# Patient Record
Sex: MALE | Race: WHITE | NOT HISPANIC OR LATINO | Employment: PART TIME | URBAN - METROPOLITAN AREA
[De-identification: names, ages, dates, MRNs, and addresses within clinical notes are randomized per-mention and may not be internally consistent; named-entity substitution may affect disease eponyms.]

---

## 2017-01-06 ENCOUNTER — ALLSCRIPTS OFFICE VISIT (OUTPATIENT)
Dept: OTHER | Facility: OTHER | Age: 32
End: 2017-01-06

## 2017-02-28 ENCOUNTER — ALLSCRIPTS OFFICE VISIT (OUTPATIENT)
Dept: OTHER | Facility: OTHER | Age: 32
End: 2017-02-28

## 2017-02-28 DIAGNOSIS — K21.9 GASTRO-ESOPHAGEAL REFLUX DISEASE WITHOUT ESOPHAGITIS: ICD-10-CM

## 2017-03-23 ENCOUNTER — APPOINTMENT (OUTPATIENT)
Dept: LAB | Facility: MEDICAL CENTER | Age: 32
End: 2017-03-23
Attending: INTERNAL MEDICINE
Payer: COMMERCIAL

## 2017-03-23 DIAGNOSIS — K21.9 GASTRO-ESOPHAGEAL REFLUX DISEASE WITHOUT ESOPHAGITIS: ICD-10-CM

## 2017-03-23 LAB
ALBUMIN SERPL BCP-MCNC: 4 G/DL (ref 3.5–5)
ALP SERPL-CCNC: 63 U/L (ref 46–116)
ALT SERPL W P-5'-P-CCNC: 29 U/L (ref 12–78)
ANION GAP SERPL CALCULATED.3IONS-SCNC: 3 MMOL/L (ref 4–13)
AST SERPL W P-5'-P-CCNC: 15 U/L (ref 5–45)
BASOPHILS # BLD AUTO: 0.04 THOUSANDS/ΜL (ref 0–0.1)
BASOPHILS NFR BLD AUTO: 1 % (ref 0–1)
BILIRUB SERPL-MCNC: 0.3 MG/DL (ref 0.2–1)
BUN SERPL-MCNC: 17 MG/DL (ref 5–25)
CALCIUM SERPL-MCNC: 8.7 MG/DL (ref 8.3–10.1)
CHLORIDE SERPL-SCNC: 105 MMOL/L (ref 100–108)
CO2 SERPL-SCNC: 30 MMOL/L (ref 21–32)
CREAT SERPL-MCNC: 0.79 MG/DL (ref 0.6–1.3)
EOSINOPHIL # BLD AUTO: 0.29 THOUSAND/ΜL (ref 0–0.61)
EOSINOPHIL NFR BLD AUTO: 5 % (ref 0–6)
ERYTHROCYTE [DISTWIDTH] IN BLOOD BY AUTOMATED COUNT: 12.7 % (ref 11.6–15.1)
GFR SERPL CREATININE-BSD FRML MDRD: >60 ML/MIN/1.73SQ M
GLUCOSE P FAST SERPL-MCNC: 80 MG/DL (ref 65–99)
HCT VFR BLD AUTO: 44.5 % (ref 36.5–49.3)
HGB BLD-MCNC: 15.3 G/DL (ref 12–17)
LYMPHOCYTES # BLD AUTO: 1.74 THOUSANDS/ΜL (ref 0.6–4.47)
LYMPHOCYTES NFR BLD AUTO: 32 % (ref 14–44)
MCH RBC QN AUTO: 32 PG (ref 26.8–34.3)
MCHC RBC AUTO-ENTMCNC: 34.4 G/DL (ref 31.4–37.4)
MCV RBC AUTO: 93 FL (ref 82–98)
MONOCYTES # BLD AUTO: 0.54 THOUSAND/ΜL (ref 0.17–1.22)
MONOCYTES NFR BLD AUTO: 10 % (ref 4–12)
NEUTROPHILS # BLD AUTO: 2.77 THOUSANDS/ΜL (ref 1.85–7.62)
NEUTS SEG NFR BLD AUTO: 52 % (ref 43–75)
NRBC BLD AUTO-RTO: 0 /100 WBCS
PLATELET # BLD AUTO: 253 THOUSANDS/UL (ref 149–390)
PMV BLD AUTO: 10 FL (ref 8.9–12.7)
POTASSIUM SERPL-SCNC: 3.9 MMOL/L (ref 3.5–5.3)
PROT SERPL-MCNC: 7.1 G/DL (ref 6.4–8.2)
RBC # BLD AUTO: 4.78 MILLION/UL (ref 3.88–5.62)
SODIUM SERPL-SCNC: 138 MMOL/L (ref 136–145)
WBC # BLD AUTO: 5.43 THOUSAND/UL (ref 4.31–10.16)

## 2017-03-23 PROCEDURE — 85025 COMPLETE CBC W/AUTO DIFF WBC: CPT

## 2017-03-23 PROCEDURE — 80053 COMPREHEN METABOLIC PANEL: CPT

## 2017-03-23 PROCEDURE — 36415 COLL VENOUS BLD VENIPUNCTURE: CPT

## 2017-03-24 ENCOUNTER — GENERIC CONVERSION - ENCOUNTER (OUTPATIENT)
Dept: OTHER | Facility: OTHER | Age: 32
End: 2017-03-24

## 2017-03-28 ENCOUNTER — APPOINTMENT (OUTPATIENT)
Dept: LAB | Facility: MEDICAL CENTER | Age: 32
End: 2017-03-28
Attending: INTERNAL MEDICINE
Payer: COMMERCIAL

## 2017-03-28 DIAGNOSIS — K21.9 GASTRO-ESOPHAGEAL REFLUX DISEASE WITHOUT ESOPHAGITIS: ICD-10-CM

## 2017-03-28 PROCEDURE — 87338 HPYLORI STOOL AG IA: CPT

## 2017-03-29 LAB — H PYLORI AG STL QL IA: NEGATIVE

## 2017-04-12 ENCOUNTER — GENERIC CONVERSION - ENCOUNTER (OUTPATIENT)
Dept: OTHER | Facility: OTHER | Age: 32
End: 2017-04-12

## 2017-04-21 ENCOUNTER — ALLSCRIPTS OFFICE VISIT (OUTPATIENT)
Dept: OTHER | Facility: OTHER | Age: 32
End: 2017-04-21

## 2017-04-26 RX ORDER — ACETAMINOPHEN 325 MG/1
325 TABLET ORAL
COMMUNITY
End: 2018-05-21

## 2017-04-26 RX ORDER — NICOTINE POLACRILEX 4 MG/1
20 GUM, CHEWING ORAL
COMMUNITY
End: 2018-03-27 | Stop reason: SDUPTHER

## 2017-04-26 RX ORDER — NAPROXEN SODIUM 220 MG
220 TABLET ORAL 2 TIMES DAILY WITH MEALS
COMMUNITY
End: 2018-03-05 | Stop reason: SDUPTHER

## 2017-04-30 ENCOUNTER — ANESTHESIA EVENT (OUTPATIENT)
Dept: GASTROENTEROLOGY | Facility: MEDICAL CENTER | Age: 32
End: 2017-04-30
Payer: COMMERCIAL

## 2017-05-01 ENCOUNTER — ANESTHESIA (OUTPATIENT)
Dept: GASTROENTEROLOGY | Facility: MEDICAL CENTER | Age: 32
End: 2017-05-01
Payer: COMMERCIAL

## 2017-05-01 ENCOUNTER — GENERIC CONVERSION - ENCOUNTER (OUTPATIENT)
Dept: GASTROENTEROLOGY | Facility: MEDICAL CENTER | Age: 32
End: 2017-05-01

## 2017-05-01 ENCOUNTER — HOSPITAL ENCOUNTER (OUTPATIENT)
Facility: MEDICAL CENTER | Age: 32
Setting detail: OUTPATIENT SURGERY
Discharge: HOME | End: 2017-05-01
Attending: INTERNAL MEDICINE | Admitting: INTERNAL MEDICINE
Payer: COMMERCIAL

## 2017-05-01 VITALS
BODY MASS INDEX: 27.31 KG/M2 | OXYGEN SATURATION: 95 % | HEIGHT: 64 IN | RESPIRATION RATE: 16 BRPM | TEMPERATURE: 97.7 F | HEART RATE: 86 BPM | DIASTOLIC BLOOD PRESSURE: 85 MMHG | SYSTOLIC BLOOD PRESSURE: 129 MMHG | WEIGHT: 160 LBS

## 2017-05-01 DIAGNOSIS — K21.9 GASTRO-ESOPHAGEAL REFLUX DISEASE WITHOUT ESOPHAGITIS: ICD-10-CM

## 2017-05-01 PROCEDURE — 88342 IMHCHEM/IMCYTCHM 1ST ANTB: CPT | Performed by: INTERNAL MEDICINE

## 2017-05-01 PROCEDURE — 88305 TISSUE EXAM BY PATHOLOGIST: CPT | Performed by: INTERNAL MEDICINE

## 2017-05-01 RX ORDER — SODIUM CHLORIDE 9 MG/ML
125 INJECTION, SOLUTION INTRAVENOUS CONTINUOUS
Status: DISCONTINUED | OUTPATIENT
Start: 2017-05-01 | End: 2017-05-01 | Stop reason: HOSPADM

## 2017-05-01 RX ORDER — PROPOFOL 10 MG/ML
INJECTION, EMULSION INTRAVENOUS AS NEEDED
Status: DISCONTINUED | OUTPATIENT
Start: 2017-05-01 | End: 2017-05-01 | Stop reason: SURG

## 2017-05-01 RX ADMIN — PROPOFOL 120 MG: 10 INJECTION, EMULSION INTRAVENOUS at 09:46

## 2017-05-01 RX ADMIN — PROPOFOL 80 MG: 10 INJECTION, EMULSION INTRAVENOUS at 09:48

## 2017-05-01 RX ADMIN — SODIUM CHLORIDE 125 ML/HR: 0.9 INJECTION, SOLUTION INTRAVENOUS at 09:15

## 2017-05-11 ENCOUNTER — GENERIC CONVERSION - ENCOUNTER (OUTPATIENT)
Dept: OTHER | Facility: OTHER | Age: 32
End: 2017-05-11

## 2017-05-12 ENCOUNTER — ALLSCRIPTS OFFICE VISIT (OUTPATIENT)
Dept: OTHER | Facility: OTHER | Age: 32
End: 2017-05-12

## 2017-05-31 ENCOUNTER — ALLSCRIPTS OFFICE VISIT (OUTPATIENT)
Dept: OTHER | Facility: OTHER | Age: 32
End: 2017-05-31

## 2017-05-31 ENCOUNTER — HOSPITAL ENCOUNTER (OUTPATIENT)
Dept: RADIOLOGY | Facility: CLINIC | Age: 32
Discharge: HOME | End: 2017-05-31
Payer: COMMERCIAL

## 2017-05-31 DIAGNOSIS — M54.9 DORSALGIA: ICD-10-CM

## 2017-05-31 PROCEDURE — 72082 X-RAY EXAM ENTIRE SPI 2/3 VW: CPT

## 2017-06-08 ENCOUNTER — ALLSCRIPTS OFFICE VISIT (OUTPATIENT)
Dept: OTHER | Facility: OTHER | Age: 32
End: 2017-06-08

## 2017-07-06 ENCOUNTER — ALLSCRIPTS OFFICE VISIT (OUTPATIENT)
Dept: OTHER | Facility: OTHER | Age: 32
End: 2017-07-06

## 2017-08-03 ENCOUNTER — ALLSCRIPTS OFFICE VISIT (OUTPATIENT)
Dept: OTHER | Facility: OTHER | Age: 32
End: 2017-08-03

## 2017-08-09 ENCOUNTER — GENERIC CONVERSION - ENCOUNTER (OUTPATIENT)
Dept: OTHER | Facility: OTHER | Age: 32
End: 2017-08-09

## 2017-08-14 ENCOUNTER — GENERIC CONVERSION - ENCOUNTER (OUTPATIENT)
Dept: OTHER | Facility: OTHER | Age: 32
End: 2017-08-14

## 2017-09-15 ENCOUNTER — ALLSCRIPTS OFFICE VISIT (OUTPATIENT)
Dept: OTHER | Facility: OTHER | Age: 32
End: 2017-09-15

## 2017-09-27 ENCOUNTER — ALLSCRIPTS OFFICE VISIT (OUTPATIENT)
Dept: OTHER | Facility: OTHER | Age: 32
End: 2017-09-27

## 2017-10-26 NOTE — PROGRESS NOTES
Assessment  1  Chronic right-sided thoracic back pain (724 1,338 29) (M54 6,G89 29)   2  Chronic myofascial pain (729 1,338 29) (M79 1,G89 29)   3  Pain syndrome, chronic (338 4) (G89 4)    Plan  Chronic myofascial pain, Chronic right-sided thoracic back pain    · Chlorzoxazone 500 MG Oral Tablet; TAKE 1 TABLET TWICE DAILY   Rx By: Oz Mode; Dispense: 30 Days ; #:60 Tablet; Refill: 1;For: Chronic myofascial pain, Chronic right-sided thoracic back pain; ANAM = N; Verified Transmission to [facility]ARMACY #[id number] Last Updated By: System, [employer]; 9/15/2017 12:15:43 PM   · Follow-up Visit in 4 Weeks Evaluation and Treatment  Follow-up with NP  Status: Hold  For - Scheduling  Requested for: [id number]   Ordered; For: Chronic myofascial pain, Chronic right-sided thoracic back pain; Ordered By: Oz Hwang Performed:  Due: 71AIW2213    Discussion/Summary    Plan:Discontinue duloxetineInitiate chlorzoxazoneFollow-up nurse practitioner in 4 weeks, consider adding topical agents  Chief Complaint  1  Back Pain    History of Present Illness  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 myofascial pain along the right paraspinal region of his thoracic spine  He did trial duloxetine became nauseous and vomited from this medication  At this time continue it  this time he states his pain is worsening rates this as an 8/10  This is constant and described as sharp and shooting  He localizes this along the right side of his paraspinal thoracic muscles  multiple options including TENS unit and topical agents  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   have personally reviewed and/or updated the patient's past medical history, past surgical history, family history, social history, current medications, allergies, and vital signs today          Donal Alejandro presents with complaints of gradual onset of constant episodes of severe bilateral mid back pain, described as sharp, radiating to the left shoulder  On a scale of 1 to 10, the patient rates the pain as 8  Symptoms are worsening  Review of Systems    Constitutional: no fever,-- no recent weight gain-- and-- no recent weight loss  Eyes: no double vision-- and-- no blurry vision  Cardiovascular: no chest pain,-- no palpitations-- and-- no lower extremity edema  Respiratory: shortness of breath, but-- no wheezing  Musculoskeletal: decreased range of motion, but-- no difficulty walking,-- no muscle weakness,-- no joint stiffness,-- no joint swelling,-- no limb swelling-- and-- no pain in extremity  Neurological: no dizziness,-- no difficulty swallowing,-- no memory loss,-- no loss of consciousness-- and-- no seizures  Gastrointestinal: no nausea,-- no vomiting,-- no constipation-- and-- no diarrhea  Genitourinary: no difficulty initiating urine stream,-- no genital pain-- and-- no frequent urination  Integumentary: no complaints of skin rash  Psychiatric: no depression  Endocrine: no excessive thirst,-- no adrenal disease,-- no hypothyroidism-- and-- no hyperthyroidism  Hematologic/Lymphatic: no tendency for easy bruising-- and-- no tendency for easy bleeding  Active Problems  1  Backache (724 5) (M54 9)   2  Cervicalgia (723 1) (M54 2)   3  Chronic myofascial pain (729 1,338 29) (M79 1,G89 29)   4  Chronic right-sided thoracic back pain (724 1,338 29) (M54 6,G89 29)   5  Depression (311) (F32 9)   6  GERD without esophagitis (530 81) (K21 9)   7  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)   8  Migraine headache (346 90) (G43 909)   9  Pain syndrome, chronic (338 4) (G89 4)   10  Shoulder Strain (840 9)   11  Strain of thoracic spine (847 1) (S29 019A)   12  Thoracogenic scoliosis (737 34) (M41 30)    Past Medical History  1  History of Alcoholism (303 90) (F10 20)   2  History of Anxiety (300 00) (F41 9)   3  History of nausea (V12 79) (Z87 898)   4  History of Pain in wrist, unspecified laterality (719 43) (M25 539)   5   History of Pre-syncope (780 2) (R55)   6  History of Tinea corporis (110 5) (B35 4)    Surgical History  1  History of Fusion / Refusion Of 2-8 Vertebrae   2  History of Spine Repair    Family History  Mother    1  Denied: Family history of Colon cancer   2  Denied: Family history of colitis   3  Denied: Family history of colonic polyps   4  Denied: Family history of Crohn's disease   5  Denied: Family history of liver disease  Father    10  Denied: Family history of Colon cancer   7  Denied: Family history of colitis   8  Denied: Family history of colonic polyps   9  Denied: Family history of Crohn's disease   10  Denied: Family history of liver disease  Unknown    11  Family history of Back disorder   12  Family history of Diabetes   13  Family history of Heart disease   14  Family history of High blood pressure    Social History   · Being A Social Drinker   · Cigarette smoker (305 1) (F17 210)   · Current Every Day Smoker (305 1)    Current Meds   1  Naproxen Sodium 220 MG Oral Tablet; take 2 tablet twice daily; Therapy: (Recorded:06Jan2017) to Recorded   2  Omeprazole 20 MG Oral Capsule Delayed Release; TAKE 1 CAPSULE TWICE DAILY; Therapy: 32JTN8103 to (Evaluate:21Vxp1350)  Requested for: 21Apr2017; Last   Rx:21Apr2017 Ordered   3  Tylenol 325 MG Oral Tablet; Therapy: (Recorded:06Jan2017) to Recorded    Allergies  1  Augmentin TABS   2  Augmentin SUSR    Vitals  Vital Signs    Recorded: 15Sep2017 11:40AM   Heart Rate 72   Respiration 14   Systolic 553   Diastolic 74   Height 5 ft 4 in   Weight 172 lb    BMI Calculated 29 52   BSA Calculated 1 83   Pain Scale 8     Physical Exam    Constitutional   General appearance: Well developed, well nourished, alert, in no distress, non-toxic and no overt pain behavior  Eyes   Sclera: anicteric   HEENT   Hearing grossly intact  Pulmonary   Respiratory effort: Even and unlabored  Skin   Skin and subcutaneous tissue: Normal without rashes or lesions, well hydrated  Psychiatric   Mood and affect: Mood and affect appropriate  Neurologic   Cranial nerves: Cranial nerves II-XII grossly intact  Musculoskeletal   Gait and station: Normal     Thoracic Spine examination demonstrates  tenderness over the right thoracic paraspinal muscles is reproduced on exam today        Signatures   Electronically signed by : Dylon De Souza DO; Sep 15 2017 12:18PM EST                       (Author)

## 2017-10-27 NOTE — PROGRESS NOTES
Assessment  1  Chronic right-sided thoracic back pain (724 1,338 29) (M54 6,G89 29)   2  Chronic myofascial pain (729 1,338 29) (M79 1,G89 29)   3  Cervicalgia (723 1) (M54 2)   4  Pain syndrome, chronic (338 4) (G89 4)   5  Pain of left scapula (733 90) (M89 8X1)    Plan  Chronic myofascial pain    · Methocarbamol 500 MG Oral Tablet; Take 1 PO in AM and 2 PO HS   Rx By: [employer]; Dispense: 30 Days ; #:90 Tablet; Refill: 1;For: Chronic myofascial pain; ANAM = N; Rx auto-faxed to [facility]/PHARMACY #[id number] Last Updated By: System, SureScript[employer]; 9/27/2017 11:06:57 AM  Chronic myofascial pain, Chronic right-sided thoracic back pain    · Chlorzoxazone 500 MG Oral Tablet   Rx By: Cyndi Luis; Dispense: 30 Days ; #:60 Tablet; Refill: 1;For: Chronic myofascial pain, Chronic right-sided thoracic back pain; ANAM = N; Sent To: [facility]/PHARMACY #[id number] Last Updated By: [employer]; 9/27/2017 11:05:57 AM  Chronic myofascial pain, Chronic right-sided thoracic back pain, Pain of left scapula,  Pain syndrome, chronic    · Nortriptyline HCl - 10 MG Oral Capsule; Take 1 po HS x 10 days, then 2 PO HS x  10 days, then 3 PO HS   Rx By: Te[employer]Success; Dispense: 30 Days ; #:90 Capsule; Refill: 1;For: Chronic myofascial pain, Chronic right-sided thoracic back pain, Pain of left scapula, Pain syndrome, chronic; ANAM = N; Rx auto-faxed to [facility]/PHARMACY #[id number] Last Updated By: System, SureScripts; 9/27/2017 11:06:57 AM  Pain syndrome, chronic    · Follow-up visit in 6 weeks Evaluation and Treatment  Follow-up  Status: Hold For -  Scheduling  Requested for: 47MPT9900   Ordered; For: Pain syndrome, chronic; Ordered By: TeensSuccess Performed:  Due: 24LMK2331  Unlinked    · Naproxen Sodium 220 MG Oral Tablet; take 2 tablet twice daily   Dispense: 0 Days ; #: Sufficient Tablet; Refill: 0; ANAM = N; Record;  Last Updated By: [employer]; 9/27/2017 11:05:57 AM    Discussion/Summary    While the patient was in the office today, I did have a thorough conversation with the patient regarding his medication regimen and treatment plan  I explained to the patient at this point time since he has tried and failed several procedures, without relief, it looks like our only option is to try to find a medication regimen the provides stable and consistent relief of the neuropathic and myofascial component of his pain  At this point we are going to discontinue the chlorzoxazone and since he reports methocarbamol has been helpful in the past, I am going to put him on methocarbamol 500 mg 1 pill in the morning and 2 pills at bedtime as this will hopefully encourage a more restful night's sleep  better address the neuropathic component, I explained to the patient that since he seems somewhat sensitive to these types of medications I am going to try Nortriptyline but start him on a very low dose of 10 mg and slowly titrate him up to 30 mg over the next several weeks and see how he does  The patient denied being prescribed any anti-depressant and/or psychiatric medications  I reviewed with the patient that it may take 3-4 weeks for the medication's effects to be noticed and that it should never be abruptly stopped  Possible side effects include but are not limited to; vertigo, lethargy, nausea, worsening depression/anxiety, and confusion  I advised the patient to call our office if they experience any side effects  The patient verbalized an understanding  advised the patient that if they experience any side effects or issues with the changes in their medication regiment, they should give our office a call to discuss  I also advised the patient not to drive or operate machinery until they see how the changes in the medication regimen affects them  The patient was agreeable and verbalized an understanding  patient is schedule a follow-up office visit in 6 weeks and at that point time we will Re group with regards to his medication regimen and treatment plan   The patient was agreeable and verbalized an understanding  The patient has the current Goals: To find a medication regimen and treatment plan the provides at least moderate stable relief, without side effects  The patent has the current Barriers: Chronic pain syndrome and history of alcoholism  Patient is able to Self-Care  Educational resources provided: The patient was in the office today, I discussed with the patient that with medication management our overall goal is to reduce the pain symptoms by 50%, 50% of the time, on the least amount medications, with the least amount side effects  The patient was agreeable and verbalized an understanding  Possible side effects of new medications were reviewed with the patient/guardian today  The treatment plan was reviewed with the patient/guardian  The patient/guardian understands and agrees with the treatment plan   The patient was counseled regarding instructions for management,-- prognosis,-- patient and family education,-- impressions,-- risks and benefits of treatment options-- and-- importance of compliance with treatment  total time of encounter was 25 minutes  Chief Complaint  1  Pain  Chronic left sided scapular and right sided thoracic pain/spasms  History of Present Illness  The patient presents today for a follow-up office visit  The patient has a history of what appears to be a thorocolumbar fusion with chronic pain and spasms  He is currently being treated for his chronic left-sided scapular and right-sided mid to lower thoracic pain and spasms, which she reports has not improved since his last office visit despite the changes made to his medication regimen  He denies any lower extremity radicular symptoms, but does report that when the pain is severe he does have upper extremity radicular symptoms at times and he reports occasional weakness where he feels he cannot hold onto objects  However, this only occurs when the pain is severe   The patient reports that in the past he has tried and failed chiropractic treatment, trigger point injections, physical therapy, home TENS unit, and does proceed with massage therapy every other week  Since his last office visit he did try the chlorzoxazone, which she reports did not cause any side effects, however, it also did not provide any relief  In the past he has also tried and failed Cymbalta, gabapentin, and cyclobenzaprine  He has also been on opioid medications, however, he does have a history of alcoholism and at this point opioid medications are not recommended because of his history and his current pain symptoms and etiology  The patient presents today to discuss his medication regimen treatment plan options  Claudio Sandoval presents with complaints of constant episodes of moderate bilateral upper back and left shoulder pain, described as sharp, radiating to the upper back and left shoulder  On a scale of 1 to 10, the patient rates the pain as 7  Symptoms are unchanged  Review of Systems    Constitutional: no fever,-- no recent weight gain-- and-- no recent weight loss  Eyes: no double vision-- and-- no blurry vision  Cardiovascular: no chest pain,-- no palpitations-- and-- no lower extremity edema  Respiratory: shortness of breath, but-- no wheezing  Musculoskeletal: difficulty walking-- and-- decreased range of motion, but-- no muscle weakness,-- no joint stiffness,-- no joint swelling,-- no limb swelling-- and-- no pain in extremity  Neurological: no dizziness,-- no difficulty swallowing,-- no memory loss,-- no loss of consciousness-- and-- no seizures  Gastrointestinal: no nausea,-- no vomiting,-- no constipation-- and-- no diarrhea  Genitourinary: no difficulty initiating urine stream,-- no genital pain-- and-- no frequent urination  Integumentary: no complaints of skin rash  Psychiatric: no depression     Endocrine: no excessive thirst,-- no adrenal disease,-- no hypothyroidism-- and-- no hyperthyroidism  Hematologic/Lymphatic: no tendency for easy bruising-- and-- no tendency for easy bleeding  Active Problems  1  Backache (724 5) (M54 9)   2  Cervicalgia (723 1) (M54 2)   3  Chronic myofascial pain (729 1,338 29) (M79 1,G89 29)   4  Chronic right-sided thoracic back pain (724 1,338 29) (M54 6,G89 29)   5  Depression (311) (F32 9)   6  GERD without esophagitis (530 81) (K21 9)   7  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)   8  Migraine headache (346 90) (G43 909)   9  Pain syndrome, chronic (338 4) (G89 4)   10  Shoulder Strain (840 9)   11  Strain of thoracic spine (847 1) (S29 019A)   12  Thoracogenic scoliosis (737 34) (M41 30)    Past Medical History  1  History of Alcoholism (303 90) (F10 20)   2  History of Anxiety (300 00) (F41 9)   3  History of nausea (V12 79) (Z87 898)   4  History of Pain in wrist, unspecified laterality (719 43) (M25 539)   5  History of Pre-syncope (780 2) (R55)   6  History of Tinea corporis (110 5) (B35 4)    The active problems and past medical history were reviewed and updated today  Surgical History  1  History of Fusion / Refusion Of 2-8 Vertebrae   2  History of Spine Repair    The surgical history was reviewed and updated today  Family History  Mother    1  Denied: Family history of Colon cancer   2  Denied: Family history of colitis   3  Denied: Family history of colonic polyps   4  Denied: Family history of Crohn's disease   5  Denied: Family history of liver disease  Father    10  Denied: Family history of Colon cancer   7  Denied: Family history of colitis   8  Denied: Family history of colonic polyps   9  Denied: Family history of Crohn's disease   10  Denied: Family history of liver disease  Unknown    11  Family history of Back disorder   12  Family history of Diabetes   13  Family history of Heart disease   14  Family history of High blood pressure    The family history was reviewed and updated today         Social History   · Being A Social Drinker   · Cigarette smoker (305 1) (F17 210)   · Current Every Day Smoker (305 1)  The social history was reviewed and updated today  The social history was reviewed and is unchanged  Current Meds   1  Chlorzoxazone 500 MG Oral Tablet; TAKE 1 TABLET TWICE DAILY; Therapy: 87Zkh0426 to (Evaluate:14Nov2017)  Requested for: 85Skx5216; Last   Rx:99Ueh9074 Ordered   2  Naproxen Sodium 220 MG Oral Tablet; take 2 tablet twice daily; Therapy: (Recorded:23Hto6393) to Recorded   3  Omeprazole 20 MG Oral Capsule Delayed Release; TAKE 1 CAPSULE TWICE DAILY; Therapy: 99RRM5813 to (Evaluate:78Zuv8174)  Requested for: 17Oug4333; Last   Rx:96Oeo3559 Ordered   4  Tylenol 325 MG Oral Tablet; Therapy: (77 385 106) to Recorded    The medication list was reviewed and updated today  Allergies  1  Augmentin TABS   2  Augmentin SUSR    Vitals  Vital Signs    Recorded: 37AIV2316 10:44AM   Temperature 98 5 F   Heart Rate 76   Systolic 167   Diastolic 78   Height 5 ft 4 in   Weight 173 lb    BMI Calculated 29 7   BSA Calculated 1 85   Pain Scale 7     Physical Exam    Constitutional   General appearance: Well developed, well nourished, alert, in no distress, non-toxic and no overt pain behavior  Eyes   Sclera: anicteric   HEENT   Hearing grossly intact  Pulmonary   Respiratory effort: Even and unlabored  Cardiovascular   Examination of extremities: No edema or pitting edema present  Abdomen   Abdomen: Soft, non-tender, non-distended  Skin   Skin and subcutaneous tissue: Abnormal   multiple tattoos  Psychiatric   Mood and affect: Mood and affect appropriate  Neurologic   Cranial nerves: Cranial nerves II-XII grossly intact  the muscle tone was normal   Musculoskeletal Tandem Gait: Intact   Joint Exam: -- Moderate to significant tenderness noted upon the left scapular ridge with trigger points and spasms in the left upper quarter     Thoracic Spine examination demonstrates Thoracic Spine: Appearance: Normal     Thoracic Sensory Exam:  intact to light touch and pinprick in the lower extremities  Tenderness:  thoracic spine tenderness-- and-- right paraspinal tenderness  Palpatory Findings include right-sided muscle spasms  Several trigger points noted upon exam the right mid to lower thoracic spine with spasms noted as well        Future Appointments    Date/Time Provider Specialty Site   11/08/2017 09:45 AM ADINA Mullins [facility] 15     Signatures   Electronically signed by : Josias Bernabe; Sep 27 2017 11:38AM EST                       (Author)    Electronically signed by : Dorlene Kayser, DO; Sep 27 2017 11:52AM EST

## 2017-11-15 ENCOUNTER — ALLSCRIPTS OFFICE VISIT (OUTPATIENT)
Dept: OTHER | Facility: OTHER | Age: 32
End: 2017-11-15

## 2017-11-16 NOTE — PROGRESS NOTES
Assessment    1  Chronic myofascial pain (729 1,338 29) (M79 1,G89 29)   2  Chronic right-sided thoracic back pain (724 1,338 29) (M54 6,G89 29)   3  Cervicalgia (723 1) (M54 2)   4  Pain syndrome, chronic (338 4) (G89 4)   5  Pain of left scapula (733 90) (M89 8X1)    Plan  Chronic myofascial pain    · Methocarbamol 750 MG Oral Tablet; TAKE 1 TABLET 3 TIMES DAILY AS NEEDEDFOR MUSCLE SPASM   Rx By: Amparo Tenorio; Dispense: 30 Days ; #:90 Tablet; Refill: 1;Chronic myofascial pain; ANAM = N; Verified Transmission to [id number] Last Updated By: System, SureScripts; 11/15/2017 2:06:28 PM   · Follow-up visit in 2 months Evaluation and Treatment  Follow-up with Ao for med refills Status: Hold For - Scheduling  Requested for: 74KMM6672   Ordered;Chronic myofascial pain; Ordered By: Amparo Tenorio Performed:  Due: 46EFU6124  Chronic myofascial pain, Chronic right-sided thoracic back pain, Pain of left scapula,Pain syndrome, chronic    · Nortriptyline HCl - 10 MG Oral Capsule; TAKE 3 CAPSULES AT BEDTIME   Rx By: Amparo Tenorio; Dispense: 30 Days ; #:90 Capsule; Refill: 1;Chronic myofascial pain, Chronic right-sided thoracic back pain, Pain of left scapula, Pain syndrome, chronic; ANAM = N; Verified Transmission to [facility][id number] Last Updated By: System, SureScripts; 11/15/2017 2:06:35 PM    Discussion/Summary    The patient presents today for a follow-up office visit  The patient is currently being treated for chronic right-sided thoracic back pain, chronic myofascial pain, cervicalgia, chronic pain syndrome, and pain of the left scapula  The patient has been taking methocarbamol 500 mg 1 tablet in the morning and 2 tablets at bedtime, along with nortriptyline 10 mg 3 tablets at bedtime  The patient reports 15% relief as results of his medications without any side effects or issues  He does feel that he has slightly improved since his last office visit in September    the patient tells me that the muscle relaxers will provide him the most relief I will slightly increase this dose today to 750 mg 1 tablet 3 times per day  He was given a refill of the medication  can continue with the nortriptyline as prescribed at 3 tablets at bedtime  This was also refilled  PDMP was reviewed today and was appropriate  with massage therapy  in 8 weeks for medication refills  Patient is able to Self-Care  The treatment plan was reviewed with the patient/guardian  The patient/guardian understands and agrees with the treatment plan      Chief Complaint    1  Back Pain  Upper back pain; improved      History of Present Illness  The patient presents today for a follow-up office visit  The patient is currently being treated for chronic right-sided thoracic back pain, chronic myofascial pain, cervicalgia, chronic pain syndrome, and pain of the left scapula  The patient has been taking methocarbamol 500 mg 1 tablet in the morning and 2 tablets at bedtime, along with nortriptyline 10 mg 3 tablets at bedtime  The patient reports 15% relief as results of his medications without any side effects or issues  He does feel that he has slightly improved since his last office visit in September  the patient rates his pain 5/10, this is constant in nature most bothersome at night  He describes his pain as sharp and localizes this pain to his upper back   have personally reviewed and/or updated the patient's past medical history, past surgical history, family history, social history, current medications, allergies, and vital signs today  Tessa Ambrose presents with complaints of constant episodes of bilateral mid back pain, described as sharp  On a scale of 1 to 10, the patient rates the pain as 5  Symptoms are improving  Review of Systems   Constitutional: no fever,-- no recent weight gain-- and-- no recent weight loss  Eyes: no double vision-- and-- no blurry vision    Cardiovascular: no chest pain,-- no palpitations-- and-- no lower extremity edema  Respiratory: shortness of breath, but-- no wheezing  Musculoskeletal: decreased range of motion, but-- no difficulty walking,-- no muscle weakness,-- no joint stiffness,-- no joint swelling,-- no limb swelling-- and-- no pain in extremity  Neurological: no dizziness,-- no difficulty swallowing,-- no memory loss,-- no loss of consciousness-- and-- no seizures  Gastrointestinal: no nausea,-- no vomiting,-- no constipation-- and-- no diarrhea  Genitourinary: no difficulty initiating urine stream,-- no genital pain-- and-- no frequent urination  Integumentary: no complaints of skin rash  Psychiatric: no depression  Endocrine: no excessive thirst,-- no adrenal disease,-- no hypothyroidism-- and-- no hyperthyroidism  Hematologic/Lymphatic: no tendency for easy bruising-- and-- no tendency for easy bleeding  Active Problems  1  Backache (724 5) (M54 9)   2  Cervicalgia (723 1) (M54 2)   3  Chronic myofascial pain (729 1,338 29) (M79 1,G89 29)   4  Chronic right-sided thoracic back pain (724 1,338 29) (M54 6,G89 29)   5  Depression (311) (F32 9)   6  GERD without esophagitis (530 81) (K21 9)   7  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)   8  Migraine headache (346 90) (G43 909)   9  Pain of left scapula (733 90) (M89 8X1)   10  Pain syndrome, chronic (338 4) (G89 4)   11  Shoulder Strain (840 9)   12  Strain of thoracic spine (847 1) (S29 019A)   13  Thoracogenic scoliosis (737 34) (M41 30)    Past Medical History  1  History of Alcoholism (303 90) (F10 20)   2  History of Anxiety (300 00) (F41 9)   3  History of nausea (V12 79) (Z87 898)   4  History of Pain in wrist, unspecified laterality (719 43) (M25 539)   5  History of Pre-syncope (780 2) (R55)   6  History of Tinea corporis (110 5) (B35 4)    Surgical History  1  History of Fusion / Refusion Of 2-8 Vertebrae   2  History of Spine Repair    Family History  Mother    1  Denied: Family history of Colon cancer   2  Denied: Family history of colitis   3  Denied: Family history of colonic polyps   4  Denied: Family history of Crohn's disease   5  Denied: Family history of liver disease  Father    10  Denied: Family history of Colon cancer   7  Denied: Family history of colitis   8  Denied: Family history of colonic polyps   9  Denied: Family history of Crohn's disease   10  Denied: Family history of liver disease  Unknown    11  Family history of Back disorder   12  Family history of Diabetes   13  Family history of Heart disease   14  Family history of High blood pressure    Social History     · Being A Social Drinker   · Cigarette smoker (305 1) (F17 210)   · Current Every Day Smoker (305 1)    Current Meds   1  Methocarbamol 500 MG Oral Tablet; Take 1 PO in AM and 2 PO HS; Therapy: 53DKX4399 to ([facility])  Requested for: 68DKP0592; Last Rx:81Lbc3266 Ordered   2  Naproxen Sodium 220 MG Oral Tablet; take 2 tablet twice daily; Therapy: (Recorded:58Zpa6379) to Recorded   3  Nortriptyline HCl - 10 MG Oral Capsule; Take 1 po HS x 10 days, then 2 PO HS x 10 days, then 3 PO HS; Therapy: 00YRI2758 to ([facility])  Requested for: 19UXD2637; Last Rx:06Dwe5920 Ordered   4  Omeprazole 20 MG Oral Capsule Delayed Release; TAKE 1 CAPSULE TWICE DAILY; Therapy: 28ENC4220 to (Evaluate:09Lng1680)  Requested for: 21Apr2017; Last Rx:21Apr2017 Ordered   5  Tylenol 325 MG Oral Tablet; Therapy: (Recorded:06Jan2017) to Recorded    Allergies  1  Augmentin TABS   2  Augmentin SUSR    Vitals  Vital Signs    Recorded: 24PDB3466 15:75UC   Systolic 202   Diastolic 60   Weight 091 lb    BMI Calculated 29 18   BSA Calculated 1 83   Pain Scale 5       Physical Exam   Constitutional  General appearance: Well developed, well nourished, alert, in no distress, non-toxic and no overt pain behavior  Eyes  Sclera: anicteric  HEENT  Hearing grossly intact  Pulmonary  Respiratory effort: Even and unlabored  Psychiatric  Mood and affect: Mood and affect appropriate     Neurologic Cranial nerves: Cranial nerves II-XII grossly intact  Musculoskeletal  Gait and station: Normal    Cervical Spine examination demonstrates Cervical Spine:  Tenderness: right trapezius muscle-- and-- left trapezius muscle  Thoracic Spine examination demonstrates Thoracic Spine:  Tenderness:  left paraspinal tenderness-- and-- right paraspinal tenderness  Results/Data  XR Thoracic Spine 2 View 54XCR9802 06:17PM Patricio Eagle     Test Name Result Flag Reference   XR TSpine 2 View (Report)       [facility];;[address] [place] [address];;Eunice;PA;[address] 07/19/2014 1820 07/19/2014 1825 2 IMAGES  THORACIC SPINE  INDICATION- Back pain  COMPARISON- June 12, 2012  VIEWS- AP and lateral projections& 2 images2 images  FINDINGS-  Extensive postoperative changes mid and lower thoracic spine stable from June 2012  Slight concave left scoliosis  Normal lordosis  Visualized lungs are clear  IMPRESSION- Stable postoperative changes mid-lower thoracic spine compared to June 2012 no acute fracture is seen   No obvious loosening or shift of the orthopedic hardware    Transcribed on- [employer], [address] [place], RAD DO Releasing Radiologist- CYRIL Arteaga DO Released Date Time- 07/19/14 2124 ------------------------------------------------------------------------------ 9724A RODOLFO 9724A RODOLFO     [facility] Appointments    Date/Time Provider Specialty Site   01/08/2018 08:45 AM [facility] [address]       Signatures   Electronically signed by : Tommy Means; Nov 15 2017  2:09PM EST                       (Author)    Electronically signed by : Bhanu Otto DO; Nov 15 2017  2:17PM EST

## 2018-01-08 ENCOUNTER — ALLSCRIPTS OFFICE VISIT (OUTPATIENT)
Dept: OTHER | Facility: OTHER | Age: 33
End: 2018-01-08

## 2018-01-09 NOTE — PROGRESS NOTES
Assessment   1  Chronic myofascial pain (729 1,338 29) (M79 1,G89 29)   2  Chronic right-sided thoracic back pain (724 1,338 29) (M54 6,G89 29)   3  Pain syndrome, chronic (338 4) (G89 4)   4  Strain of thoracic spine (847 1) (S29 019A)    Plan   Chronic myofascial pain    · Methocarbamol 750 MG Oral Tablet; TAKE 1 TABLET 3 TIMES DAILY AS NEEDED    FOR MUSCLE SPASM   Rx By: Vasiliy Lagunas; Dispense: 30 Days ; #:90 Tablet; Refill: 1;For: Chronic myofascial pain; ANAM = N; Verified Transmission to [facility]; Last Updated By: System, gamigo; 1/8/2018 8:50:43 AM  Chronic myofascial pain, Chronic right-sided thoracic back pain, Pain of left scapula,    Pain syndrome, chronic    · Nortriptyline HCl - 10 MG Oral Capsule; TAKE 3 CAPSULES AT BEDTIME   Rx By: Vasiliy Lagunas; Dispense: 30 Days ; #:90 Capsule; Refill: 1;For: Chronic myofascial pain, Chronic right-sided thoracic back pain, Pain of left scapula, Pain syndrome, chronic; ANAM = N; Sent To: [facility]  Chronic myofascial pain, Chronic right-sided thoracic back pain, Strain of thoracic spine    · Diclofenac Sodium 1 % Transdermal Gel; Apply 4 grams to affected area QID PRN   Rx By: Vasiliy Lagunas; Dispense: 0 Days ; #:1 GM; Refill: 1;For: Chronic myofascial pain, Chronic right-sided thoracic back pain, Strain of thoracic spine; ANAM = N; Sent To: [facility]  Pain syndrome, chronic    · Follow-up visit in 2 months Evaluation and Treatment  Follow-up with Ao for med refills     Status: Hold For - Scheduling  Requested for: 16QVU8632   Ordered; For: Pain syndrome, chronic; Ordered By: Vasiliy Lagunas Performed:  Due: 59DJI0919    Discussion/Summary      The patient presents today for a follow-up office visit  The patient is currently being treated for chronic right-sided thoracic back pain, chronic myofascial pain, cervicalgia, chronic pain syndrome, and pain of the left scapula   The patient has been taking methocarbamol 750 mg 1 tablet 4 tablets daily, along with nortriptyline 10 mg 3 tablets at bedtime  The patient reports 35% relief as results of his medications without any side effects or issues  He continues with massage therapy every other week  continue with the methocarbamol, and the Nortriptyline I would like him to take the methocarbamol 3 tablets daily which we originally prescribed  Both medications were refilled today  will also initiate diclofenac gel that he can apply topically to his affected areas up to 4 times a day  Patient is aware not to take any oral NSAID medication while he is using the gel as it is too much of the same medication  Patient verbalized understanding  PDMP was reviewed today and was appropriate   will return in 8 weeks for medication refills  Patient is able to Self-Care  The treatment plan was reviewed with the patient/guardian  The patient/guardian understands and agrees with the treatment plan      Chief Complaint   1  Back Pain  Upper back pain; History of Present Illness   The patient presents today for a follow-up office visit  The patient is currently being treated for chronic right-sided thoracic back pain, chronic myofascial pain, cervicalgia, chronic pain syndrome, and pain of the left scapula  The patient has been taking methocarbamol 750 mg 1 tablet 4 tablets daily, along with nortriptyline 10 mg 3 tablets at bedtime  The patient reports 35% relief as results of his medications without any side effects or issues  He continues with massage therapy every other week  the patient rates his pain 7/10, this is constant in nature and described as sharp  He localizes pain to his upper back    have personally reviewed and/or updated the patient's past medical history, past surgical history, family history, social history, current medications, allergies, and vital signs today         Tadeo Tracy presents with complaints of gradual onset of constant episodes of moderate right lower and right mid back pain, described as sharp, radiating to the left shoulder  On a scale of 1 to 10, the patient rates the pain as 7  Symptoms are unchanged  Review of Systems        Constitutional: no fever,-- no recent weight gain-- and-- no recent weight loss  Eyes: no double vision-- and-- no blurry vision  Cardiovascular: no chest pain,-- no palpitations-- and-- no lower extremity edema  Respiratory: shortness of breath, but-- no wheezing  Musculoskeletal: decreased range of motion, but-- no difficulty walking,-- no muscle weakness,-- no joint stiffness,-- no joint swelling,-- no limb swelling-- and-- no pain in extremity  Neurological: no dizziness,-- no difficulty swallowing,-- no memory loss,-- no loss of consciousness-- and-- no seizures  Gastrointestinal: no nausea,-- no vomiting,-- no constipation-- and-- no diarrhea  Genitourinary: no difficulty initiating urine stream,-- no genital pain-- and-- no frequent urination  Integumentary: no complaints of skin rash  Psychiatric: no depression  Endocrine: no excessive thirst,-- no adrenal disease,-- no hypothyroidism-- and-- no hyperthyroidism  Hematologic/Lymphatic: no tendency for easy bruising-- and-- no tendency for easy bleeding  Active Problems   1  Backache (724 5) (M54 9)   2  Cervicalgia (723 1) (M54 2)   3  Chronic myofascial pain (729 1,338 29) (M79 1,G89 29)   4  Chronic right-sided thoracic back pain (724 1,338 29) (M54 6,G89 29)   5  Depression (311) (F32 9)   6  GERD without esophagitis (530 81) (K21 9)   7  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)   8  Migraine headache (346 90) (G43 909)   9  Pain of left scapula (733 90) (M89 8X1)   10  Pain syndrome, chronic (338 4) (G89 4)   11  Shoulder Strain (840 9)   12  Strain of thoracic spine (847 1) (S29 019A)   13  Thoracogenic scoliosis (737 34) (M41 30)    Past Medical History   1  History of Alcoholism (303 90) (F10 20)   2   History of Anxiety (300 00) (F41 9) 3  History of nausea (V12 79) (Z87 898)   4  History of Pain in wrist, unspecified laterality (719 43) (M25 539)   5  History of Pre-syncope (780 2) (R55)   6  History of Tinea corporis (110 5) (B35 4)    Surgical History   1  History of Fusion / Refusion Of 2-8 Vertebrae   2  History of Spine Repair    Family History   Mother    1  Denied: Family history of Colon cancer   2  Denied: Family history of colitis   3  Denied: Family history of colonic polyps   4  Denied: Family history of Crohn's disease   5  Denied: Family history of liver disease  Father    10  Denied: Family history of Colon cancer   7  Denied: Family history of colitis   8  Denied: Family history of colonic polyps   9  Denied: Family history of Crohn's disease   10  Denied: Family history of liver disease  Unknown    11  Family history of Back disorder   12  Family history of Diabetes   13  Family history of Heart disease   14  Family history of High blood pressure    Social History    · Being A Social Drinker   · Cigarette smoker (305 1) (F17 210)   · Current Every Day Smoker (305 1)    Current Meds    1  Methocarbamol 750 MG Oral Tablet; TAKE 1 TABLET 3 TIMES DAILY AS NEEDED FOR     MUSCLE SPASM; Therapy: 95QUX2605 to (Evaluate:14Jan2018)  Requested for: 90OAY0875; Last     Rx:53Oxm4838 Ordered   2  Naproxen Sodium 220 MG Oral Tablet; take 2 tablet twice daily; Therapy: (Recorded:81Jny1458) to Recorded   3  Nortriptyline HCl - 10 MG Oral Capsule; TAKE 3 CAPSULES AT BEDTIME; Therapy: 20LEM7626 to (Evaluate:14Jan2018)  Requested for: 98EZQ4658; Last     Rx:40Cbp2921; Status: ACTIVE - Renewal Denied Ordered   4  Omeprazole 20 MG Oral Capsule Delayed Release; TAKE 1 CAPSULE TWICE DAILY; Therapy: 60MKG3237 to (Evaluate:92Sdc2962)  Requested for: 21Apr2017; Last     Rx:21Apr2017 Ordered   5  Tylenol 325 MG Oral Tablet; Therapy: (Recorded:06Jan2017) to Recorded    Allergies   1  Augmentin TABS   2   Augmentin SUSR    Vitals   Vital Signs    Recorded: 20ESG0999 08:28AM   Heart Rate 64   Respiration 12   Systolic 011   Diastolic 62   Height 5 ft 4 in   Weight 159 lb    BMI Calculated 27 29   BSA Calculated 1 77   Pain Scale 7     Physical Exam        Constitutional      General appearance: Well developed, well nourished, alert, in no distress, non-toxic and no overt pain behavior  Eyes      Sclera: anicteric      HEENT      Hearing grossly intact  Pulmonary      Respiratory effort: Even and unlabored  Psychiatric      Mood and affect: Mood and affect appropriate  Neurologic      Cranial nerves: Cranial nerves II-XII grossly intact  Musculoskeletal      Gait and station: Normal        Cervical Spine examination demonstrates Cervical Spine:      Tenderness: right trapezius muscle-- and-- left trapezius muscle  Thoracic Spine examination demonstrates Thoracic Spine:      Tenderness:  left paraspinal tenderness-- and-- right paraspinal tenderness        Signatures    Electronically signed by : Tommy Mendoza; Jan 8 2018  8:54AM EST                       (Author)     Electronically signed by : Franc Bennett DO; Jan 8 2018  9:28AM EST

## 2018-01-10 NOTE — RESULT NOTES
Verified Results  (1) Evin Gauthier, JOSEFINA [id number] 09:47AM [facility] Order Number: [id number]     Test Name Result Flag Reference   H PYLORI ANTIGEN Negative  Negative   Performed at:  [address]  [id number]  : Samantha Carbajal MD, Phone:  [phone]

## 2018-01-12 VITALS
HEIGHT: 64 IN | DIASTOLIC BLOOD PRESSURE: 74 MMHG | RESPIRATION RATE: 14 BRPM | HEART RATE: 72 BPM | BODY MASS INDEX: 29.37 KG/M2 | SYSTOLIC BLOOD PRESSURE: 116 MMHG | WEIGHT: 172 LBS

## 2018-01-12 NOTE — RESULT NOTES
Verified Results  (1) CBC/PLT/DIFF [id number] 09:28AM [facility] Order Number: [id number]     Test Name Result Flag Reference   WBC COUNT 5 43 Thousand/uL  4 31-10 16   RBC COUNT 4 78 Million/uL  3 88-5 62   HEMOGLOBIN 15 3 g/dL  12 0-17 0   HEMATOCRIT 44 5 %  36 5-49 3   MCV 93 fL  82-98   MCH 32 0 pg  26 8-34 3   MCHC 34 4 g/dL  31 4-37 4   RDW 12 7 %  11 6-15 1   MPV 10 0 fL  8 9-12 7   PLATELET COUNT 625 Thousands/uL  149-390   nRBC AUTOMATED 0 /100 WBCs     NEUTROPHILS RELATIVE PERCENT 52 %  43-75   LYMPHOCYTES RELATIVE PERCENT 32 %  14-44   MONOCYTES RELATIVE PERCENT 10 %  4-12   EOSINOPHILS RELATIVE PERCENT 5 %  0-6   BASOPHILS RELATIVE PERCENT 1 %  0-1   NEUTROPHILS ABSOLUTE COUNT 2 77 Thousands/? ??L  1 85-7 62   LYMPHOCYTES ABSOLUTE COUNT 1 74 Thousands/? ??L  0 60-4 47   MONOCYTES ABSOLUTE COUNT 0 54 Thousand/? ??L  0 17-1 22   EOSINOPHILS ABSOLUTE COUNT 0 29 Thousand/? ??L  0 00-0 61   BASOPHILS ABSOLUTE COUNT 0 04 Thousands/? ??L  0 00-0 10   - Patient Instructions: This bloodwork is non-fasting  Please drink two glasses of water morning of bloodwork  - Patient Instructions: This bloodwork is non-fasting  Please drink two glasses of water morning of bloodwork       (1) COMPREHENSIVE METABOLIC PANEL 58AXR2531 80:32YU [facility] Order Number: [id number]     Test Name Result Flag Reference   SODIUM 138 mmol/L  136-145   POTASSIUM 3 9 mmol/L  3 5-5 3   CHLORIDE 105 mmol/L  100-108   CARBON DIOXIDE 30 mmol/L  21-32   ANION GAP (CALC) 3 mmol/L L 4-13   BLOOD UREA NITROGEN 17 mg/dL  5-25   CREATININE 0 79 mg/dL  0 60-1 30   Standardized to IDMS reference method   CALCIUM 8 7 mg/dL  8 3-10 1   BILI, TOTAL 0 30 mg/dL  0 20-1 00   ALK PHOSPHATAS 63 U/L     ALT (SGPT) 29 U/L  12-78   AST(SGOT) 15 U/L  5-45   ALBUMIN 4 0 g/dL  3 5-5 0   TOTAL PROTEIN 7 1 g/dL  6 4-8 2   eGFR Non-African American      >60 0 ml/min/1 73sq ke [employer] Disease Education Program recommendations are as follows:  GFR calculation is accurate only with a steady state creatinine  Chronic Kidney disease less than 60 ml/min/1 73 sq  meters  Kidney failure less than 15 ml/min/1 73 sq  meters     GLUCOSE FASTING 80 mg/dL  65-99

## 2018-01-12 NOTE — MISCELLANEOUS
Message   Recorded as Task   Date: 08/09/2017 10:02 AM, Created By: Nelson Amin   Task Name: Miscellaneous   Assigned To: [address] clinical,Team   Regarding Patient: Nader Junior, Status: Active   Comment:    Linh Montes - 09 Aug 2017 10:02 AM     TASK CREATED  Pt's wife Joy called stating pt saw Dr Yue Powell 8/3 and was prescribed a medication (she did not know the name of it) that is making pt throw up and is dizzy  She said he has been like that for the last few days  [facility] gave callback # of [phone] (pt's #)  Jennyfer Mojica - 09 Aug 2017 11:25 AM     TASK EDITED  LMOM on home/cell # for pt to C/B, C/B # provided  **[place] not listed on release of health informationStanislavabner Lee - 09 Aug 2017 11:43 AM     TASK EDITED  pt returning call please call pt back at [phone] - 09 Aug 2017 2:17 PM     TASK EDITED  S/W pt  Pt stated he started taking Duloxetine for the last 4-5 days and has been feeling sick the last 4-5 days- vomitting every am only and dizziness  Pt has hot/cold flushes all day and is drowsy after he takes it in the pm   Pt asking if this is related to the Duloxetine and if he should stop taking the medication? Advised pt will get this message to [address] and C/B w/ his recommendations  Please advise  Artie Cruz - 09 Aug 2017 2:45 PM     TASK REPLIED TO: Previously Assigned To Louie Helm                      aware, discontinue medication now   Renee Servin - 09 Aug 2017 3:04 PM     TASK EDITED  S/W pt  Advised pt of the same  Pt verbalized understanding  Active Problems    1  Backache (724 5) (M54 9)   2  Cervicalgia (723 1) (M54 2)   3  Chronic myofascial pain (729 1,338 29) (M79 1,G89 29)   4  Chronic pain syndrome (338 4) (G89 4)   5  Chronic right-sided thoracic back pain (724 1,338 29) (M54 6,G89 29)   6  Depression (311) (F32 9)   7  GERD without esophagitis (530 81) (K21 9)   8   Idiopathic Rhabdomyolysis (781 58) 9  Migraine headache (346 90) (G43 909)   10  Shoulder Strain (840 9)   11  Strain of thoracic spine (847 1) (S29 019A)   12  Thoracogenic scoliosis (737 34) (M41 30)    Current Meds   1  DULoxetine HCl - 30 MG Oral Capsule Delayed Release Particles; TAKE ONE CAPSULE   BY MOUTH EVERY DAY; Therapy: 04Egt2128 to [phone]  Requested for: 03Aug2017; Last   Rx:03Aug2017 Ordered   2  Naproxen Sodium 220 MG Oral Tablet; take 2 tablet twice daily; Therapy: (Recorded:06Jan2017) to Recorded   3  Omeprazole 20 MG Oral Capsule Delayed Release; TAKE 1 CAPSULE TWICE DAILY; Therapy: 42IZL0163 to (Evaluate:97Hga9649)  Requested for: 21Apr2017; Last   Rx:21Apr2017 Ordered   4  Tylenol 325 MG Oral Tablet; Therapy: (Recorded:06Jan2017) to Recorded    Allergies    1  Augmentin TABS   2   Augmentin SUSR    Signatures   Electronically signed by : Christopher Lawson, ; Aug  9 2017  3:04PM EST                       (Author)

## 2018-01-13 VITALS
HEIGHT: 64 IN | DIASTOLIC BLOOD PRESSURE: 78 MMHG | SYSTOLIC BLOOD PRESSURE: 120 MMHG | WEIGHT: 164 LBS | RESPIRATION RATE: 14 BRPM | HEART RATE: 72 BPM | BODY MASS INDEX: 28 KG/M2

## 2018-01-13 VITALS
TEMPERATURE: 97.8 F | DIASTOLIC BLOOD PRESSURE: 86 MMHG | OXYGEN SATURATION: 99 % | HEIGHT: 64 IN | WEIGHT: 149.38 LBS | BODY MASS INDEX: 25.5 KG/M2 | HEART RATE: 80 BPM | SYSTOLIC BLOOD PRESSURE: 120 MMHG

## 2018-01-13 VITALS
BODY MASS INDEX: 27.49 KG/M2 | OXYGEN SATURATION: 98 % | HEIGHT: 64 IN | WEIGHT: 161 LBS | TEMPERATURE: 98.7 F | SYSTOLIC BLOOD PRESSURE: 118 MMHG | DIASTOLIC BLOOD PRESSURE: 80 MMHG | HEART RATE: 87 BPM

## 2018-01-13 VITALS
BODY MASS INDEX: 27.49 KG/M2 | DIASTOLIC BLOOD PRESSURE: 78 MMHG | HEART RATE: 88 BPM | HEIGHT: 64 IN | SYSTOLIC BLOOD PRESSURE: 120 MMHG | WEIGHT: 161 LBS

## 2018-01-13 VITALS — DIASTOLIC BLOOD PRESSURE: 60 MMHG | WEIGHT: 170 LBS | BODY MASS INDEX: 29.18 KG/M2 | SYSTOLIC BLOOD PRESSURE: 120 MMHG

## 2018-01-13 VITALS
WEIGHT: 173 LBS | HEART RATE: 76 BPM | DIASTOLIC BLOOD PRESSURE: 78 MMHG | BODY MASS INDEX: 29.53 KG/M2 | TEMPERATURE: 98.5 F | HEIGHT: 64 IN | SYSTOLIC BLOOD PRESSURE: 124 MMHG

## 2018-01-14 VITALS
WEIGHT: 164 LBS | SYSTOLIC BLOOD PRESSURE: 124 MMHG | RESPIRATION RATE: 16 BRPM | DIASTOLIC BLOOD PRESSURE: 70 MMHG | BODY MASS INDEX: 28 KG/M2 | HEART RATE: 78 BPM | HEIGHT: 64 IN

## 2018-01-14 VITALS
TEMPERATURE: 98.1 F | OXYGEN SATURATION: 94 % | HEART RATE: 87 BPM | SYSTOLIC BLOOD PRESSURE: 106 MMHG | BODY MASS INDEX: 27.36 KG/M2 | WEIGHT: 160.25 LBS | HEIGHT: 64 IN | DIASTOLIC BLOOD PRESSURE: 72 MMHG

## 2018-01-14 VITALS
HEIGHT: 64 IN | RESPIRATION RATE: 14 BRPM | WEIGHT: 172 LBS | DIASTOLIC BLOOD PRESSURE: 70 MMHG | HEART RATE: 76 BPM | SYSTOLIC BLOOD PRESSURE: 126 MMHG | BODY MASS INDEX: 29.37 KG/M2

## 2018-01-17 NOTE — RESULT NOTES
Verified Results  (1) TISSUE EXAM [id number] 09:49AM [facility]     Test Name Result Flag Reference   LAB AP CASE REPORT (Report)     Surgical Pathology Report             Case: [id number]                   Authorizing Provider: Dorie Dodge MD       Collected:      05/01/2017 0949        Ordering Location:   [facility]    Received:      05/02/2017 [facility]                            Pathologist:      Adelaida Jernigan MD                              Specimen:  Stomach, Gastric biopsy r/o H pylori   LAB AP FINAL DIAGNOSIS (Report)     A  Stomach, biopsy:        - Antral and oxyntic mucosa with mild chronic inactive   gastritis  - No Helicobacter pylori organisms are identified on the   immunohistochemical stain, performed with an appropriate positive control         - No intestinal metaplasia, dysplasia or malignancy is   identified  Interpretation performed at [address], [facility]    Electronically signed by Adelaida Jernigan MD on 5/3/2017 at 10:46 AM   LAB AP SURGICAL ADDITIONAL INFORMATION (Report)     These tests were developed and their performance characteristics   determined by [facility] or [facility]  They may not be cleared or approved by the [place]  Food and   Drug Administration  The FDA has determined that such clearance or   approval is not necessary  These tests are used for clinical purposes  They should not be regarded as investigational or for research  This   laboratory has been approved by CLIA 88, designated as a high-complexity   laboratory and is qualified to perform these tests  LAB AP GROSS DESCRIPTION (Report)     A  The specimen is received in formalin, labeled with the patient's name   and hospital number, and is designated gastric biopsy rule out H    pylori  The specimen consists of 2 tan soft tissue fragments each   measuring 0 4 cm   Entirely

## 2018-01-23 VITALS
SYSTOLIC BLOOD PRESSURE: 102 MMHG | WEIGHT: 159 LBS | DIASTOLIC BLOOD PRESSURE: 62 MMHG | BODY MASS INDEX: 27.14 KG/M2 | RESPIRATION RATE: 12 BRPM | HEART RATE: 64 BPM | HEIGHT: 64 IN

## 2018-03-05 ENCOUNTER — OFFICE VISIT (OUTPATIENT)
Dept: PAIN MEDICINE | Facility: MEDICAL CENTER | Age: 33
End: 2018-03-05
Payer: COMMERCIAL

## 2018-03-05 VITALS
SYSTOLIC BLOOD PRESSURE: 118 MMHG | TEMPERATURE: 98.4 F | DIASTOLIC BLOOD PRESSURE: 70 MMHG | BODY MASS INDEX: 26.95 KG/M2 | WEIGHT: 157 LBS

## 2018-03-05 DIAGNOSIS — G89.29 CHRONIC RIGHT-SIDED THORACIC BACK PAIN: ICD-10-CM

## 2018-03-05 DIAGNOSIS — M79.18 MYOFASCIAL PAIN SYNDROME: Primary | ICD-10-CM

## 2018-03-05 DIAGNOSIS — G89.4 CHRONIC PAIN SYNDROME: ICD-10-CM

## 2018-03-05 DIAGNOSIS — M54.6 CHRONIC RIGHT-SIDED THORACIC BACK PAIN: ICD-10-CM

## 2018-03-05 DIAGNOSIS — M54.2 NECK PAIN: ICD-10-CM

## 2018-03-05 PROCEDURE — 99213 OFFICE O/P EST LOW 20 MIN: CPT | Performed by: NURSE PRACTITIONER

## 2018-03-05 RX ORDER — NORTRIPTYLINE HYDROCHLORIDE 10 MG/1
CAPSULE ORAL
COMMUNITY
Start: 2018-02-27 | End: 2018-03-05 | Stop reason: SDUPTHER

## 2018-03-05 RX ORDER — NORTRIPTYLINE HYDROCHLORIDE 10 MG/1
10 CAPSULE ORAL 3 TIMES DAILY
Qty: 90 CAPSULE | Refills: 2 | Status: SHIPPED | OUTPATIENT
Start: 2018-03-05 | End: 2018-04-25 | Stop reason: SDUPTHER

## 2018-03-05 RX ORDER — METHOCARBAMOL 750 MG/1
750 TABLET, FILM COATED ORAL EVERY 8 HOURS SCHEDULED
Qty: 90 TABLET | Refills: 2 | Status: SHIPPED | OUTPATIENT
Start: 2018-03-05 | End: 2018-04-25 | Stop reason: SDUPTHER

## 2018-03-05 RX ORDER — METHOCARBAMOL 750 MG/1
TABLET, FILM COATED ORAL
COMMUNITY
Start: 2018-02-27 | End: 2018-03-05 | Stop reason: SDUPTHER

## 2018-03-05 RX ORDER — NAPROXEN SODIUM 220 MG
220 TABLET ORAL 2 TIMES DAILY WITH MEALS
Qty: 60 TABLET | Refills: 2 | Status: SHIPPED | OUTPATIENT
Start: 2018-03-05 | End: 2018-07-16 | Stop reason: SDUPTHER

## 2018-03-05 RX ORDER — NICOTINE POLACRILEX 4 MG/1
1 GUM, CHEWING ORAL 2 TIMES DAILY
COMMUNITY
Start: 2017-03-03

## 2018-03-05 NOTE — PROGRESS NOTES
Pt is c/o pain in his mid and upper back  Assessment:  1  Myofascial pain syndrome    2  Neck pain    3  Chronic pain syndrome    4  Chronic right-sided thoracic back pain        Plan: At this time, the patient can continue with the methocarbamol, and the Nortriptyline as prescribed  Both medications were refilled today  The patient did not get to use the diclofenac gel as it was not approved by his insurance  Instead he did buy Aspercreme with lidocaine and has been using this 3-4 times daily  He did stop the Aleve as he was unsure if he could use this with the lidocaine cream   Patient can continue to take Aleve twice daily I did send a prescription to the pharmacy for him  Continue with massage therapy    Follow-up in 12 weeks      [place] [address] Program report was reviewed and was appropriate       History of Present Illness: The patient is a 29 y o  male who presents for a follow up office visit in regards to Back Pain  The patients current symptoms include right thoracic back pain rated 8/10, this is constant in nature most bothersome in the evening  He describes his pain as sharp, and pressure-like  Patient continues to participate [facility] other week he does feel this is helpful  Current pain medications includes:  Robaxin 750 mg 3 times a day, nortriptyline 10 mg 3 tablets at bedtime, and he uses Aspercreme with lidocaine 3 to 4 times a day  The patient reports that this regimen is providing 20% pain relief  The patient is reporting no side effects from this pain medication regimen  I have personally reviewed and/or updated the patient's past medical history, past surgical history, family history, social history, current medications, allergies, and vital signs today  Review of Systems  Review of Systems   Respiratory: Positive for shortness of breath  Cardiovascular: Negative for chest pain  Gastrointestinal: Negative for constipation, diarrhea, nausea and vomiting  Musculoskeletal: Negative for arthralgias, gait problem, joint swelling and myalgias  Difficulty walking  Decreased rom   Skin: Negative for rash  Neurological: Negative for dizziness, seizures and weakness  All other systems reviewed and are negative  Past Medical History:   Diagnosis Date    Alcoholism ([address] )     Chronic pain syndrome     Depression     GERD (gastroesophageal reflux disease)     Migraine     Rhabdomyolysis        Past Surgical History:   Procedure Laterality Date    BACK SURGERY      spine repair/fusion/refusion of 2-8 vertebrae    [place] ESOPHAGOGASTRODUODENOSCOPY TRANSORAL DIAGNOSTIC N/A 5/1/2017    Procedure: ESOPHAGOGASTRODUODENOSCOPY (EGD); Surgeon: Nubia Miller MD;  Location: [facility]; Service: Gastroenterology       Family History  Mother    1  Denied: Family history of Colon cancer   2  Denied: Family history of colitis   3  Denied: Family history of colonic polyps   4  Denied: Family history of Crohn's disease   5  Denied: Family history of liver disease  Father    10  Denied: Family history of Colon cancer   7  Denied: Family history of colitis   8  Denied: Family history of colonic polyps   9  Denied: Family history of Crohn's disease   10  Denied: Family history of liver disease  Unknown    11  Family history of Back disorder   12  Family history of Diabetes   13  Family history of Heart disease   14  Family history of High blood pressure       Social History     Occupational History    Not on file       Social History Main Topics    Smoking status: Former Smoker    Smokeless tobacco: Not on file    Alcohol use No    Drug use: No    Sexual activity: Not on file         Current Outpatient Prescriptions:     acetaminophen (TYLENOL) 325 mg tablet, Take 325 mg by mouth, Disp: , Rfl:     methocarbamol (ROBAXIN) 750 mg tablet, Take 1 tablet (750 mg total) by mouth every 8 (eight) hours, Disp: 90 tablet, Rfl: 2    naproxen sodium (ALEVE) 220 MG tablet, Take 1 tablet (220 mg total) by mouth 2 (two) times a day with meals, Disp: 60 tablet, Rfl: 2    nortriptyline (PAMELOR) 10 mg capsule, Take 1 capsule (10 mg total) by mouth 3 (three) times a day 3 tablets at bedtime, Disp: 90 capsule, Rfl: 2    Omeprazole 20 MG TBEC, Take 20 mg by mouth daily in the early morning, Disp: , Rfl:     Omeprazole (RA OMEPRAZOLE) 20 MG TBEC, Take 1 capsule by mouth 2 (two) times a day, Disp: , Rfl:     Allergies   Allergen Reactions    Augmentin [Amoxicillin-Pot Clavulanate] Other (See Comments)     AUGMENTIN rash    Other Hives     Reaction Date: [id number];        Physical Exam:    /70   Temp 98 4 °F (36 9 °C)   Wt 71 2 kg (157 lb)   BMI 26 95 kg/m²     Constitutional:normal, well developed, well nourished, alert, in no distress and non-toxic and no overt pain behavior    Eyes:anicteric  HEENT:grossly intact  Neck:supple, symmetric, trachea midline and no masses   Pulmonary:even and unlabored  Cardiovascular:No edema or pitting edema present  Skin:Normal without rashes or lesions and well hydrated  Psychiatric:Mood and affect appropriate  Neurologic:Cranial Nerves II-XII grossly intact  Musculoskeletal:normal   Thoracic Spine Exam    Appearance:  Normal lordosis  Palpation/Tenderness:  right thoracic paraspinal tenderness    Motor Strength:  Left hip flexion:  5/5  Left hip extension:  5/5  Right hip flexion:  5/5  Right hip extension:  5/5  Left knee flexion:  5/5  Left knee extension:  5/5  Right knee flexion:  5/5  Right knee extension:  5/5  Left foot dorsiflexion:  5/5  Left foot plantar flexion:  5/5  Right foot dorsiflexion:  5/5  Right foot plantar flexion:  5/5              Imaging  No orders to display     XR entire spine (scoliosis) 2-3 vw   Status: Final result   PACS Images     Show images for XR entire spine (scoliosis) 2-3 vw   Order Report      Order Details   Study Result SCOLIOSIS      INDICATION: Back pain, muscle tightness     COMPARISON: 7/19/2014     VIEWS:  Erect PA and lateral views thoracolumbar spine     IMAGES:  8     FINDINGS:     There is no acute fracture  No osseous lesion appreciated      The patient is status post spinal fusion which extends from approximately the level of T4 through the thoracolumbar junction  The utilized hardware appears intact  Intervertebral cages are placed at the lower levels and appear unchanged in position   from prior     There is levoscoliosis of the lumbar spine  When measuring from the inferior endplate of D42 to the inferior endplate of L5 with Azevedo angle is approximately 17 degrees      IMPRESSION:        1  No acute osseous abnormality  2  Levoscoliosis of the lumbar spine        Workstation performed: [id number]      Imaging     XR entire spine (scoliosis) 2-3 vw (Order #[id number]) on 5/31/2017 - Imaging Information     No orders of the defined types were placed in this encounter

## 2018-03-17 DIAGNOSIS — M79.18 MYOFASCIAL PAIN SYNDROME: ICD-10-CM

## 2018-03-17 DIAGNOSIS — G89.29 CHRONIC RIGHT-SIDED THORACIC BACK PAIN: ICD-10-CM

## 2018-03-17 DIAGNOSIS — M54.6 CHRONIC RIGHT-SIDED THORACIC BACK PAIN: ICD-10-CM

## 2018-03-19 RX ORDER — NORTRIPTYLINE HYDROCHLORIDE 10 MG/1
CAPSULE ORAL
Qty: 90 CAPSULE | Refills: 0 | OUTPATIENT
Start: 2018-03-19

## 2018-03-19 RX ORDER — METHOCARBAMOL 750 MG/1
TABLET, FILM COATED ORAL
Qty: 90 TABLET | Refills: 0 | OUTPATIENT
Start: 2018-03-19

## 2018-03-20 NOTE — TELEPHONE ENCOUNTER
S/W Fadia Mcclellan at [facility]  Advised her of the same and she stated will call the patient to tell them to call the office for refills

## 2018-03-22 NOTE — TELEPHONE ENCOUNTER
-FYI-    S/W pt  Advised pt Shama Gonzalez contacted [facility] for medication refills of nortriptyline and methocarbamol refills  Advised pt [facility] policy is the pt needs to call for medication refills  Pt stated he does not need refills on these medications and he has all the medications that he needs right now

## 2018-03-27 ENCOUNTER — OFFICE VISIT (OUTPATIENT)
Dept: FAMILY MEDICINE CLINIC | Facility: CLINIC | Age: 33
End: 2018-03-27
Payer: COMMERCIAL

## 2018-03-27 VITALS
BODY MASS INDEX: 25.99 KG/M2 | HEART RATE: 117 BPM | SYSTOLIC BLOOD PRESSURE: 122 MMHG | TEMPERATURE: 98.5 F | DIASTOLIC BLOOD PRESSURE: 84 MMHG | WEIGHT: 156 LBS | HEIGHT: 65 IN

## 2018-03-27 DIAGNOSIS — K52.9 GASTROENTERITIS: Primary | ICD-10-CM

## 2018-03-27 PROBLEM — M54.6 CHRONIC RIGHT-SIDED THORACIC BACK PAIN: Status: RESOLVED | Noted: 2018-03-05 | Resolved: 2018-03-27

## 2018-03-27 PROBLEM — M79.18 MYOFASCIAL PAIN SYNDROME: Status: RESOLVED | Noted: 2018-03-05 | Resolved: 2018-03-27

## 2018-03-27 PROBLEM — K21.9 GERD WITHOUT ESOPHAGITIS: Status: ACTIVE | Noted: 2017-01-06

## 2018-03-27 PROBLEM — G89.29 CHRONIC RIGHT-SIDED THORACIC BACK PAIN: Status: RESOLVED | Noted: 2018-03-05 | Resolved: 2018-03-27

## 2018-03-27 PROBLEM — G89.4 CHRONIC PAIN SYNDROME: Status: RESOLVED | Noted: 2018-03-05 | Resolved: 2018-03-27

## 2018-03-27 PROBLEM — M54.2 NECK PAIN: Status: RESOLVED | Noted: 2018-03-05 | Resolved: 2018-03-27

## 2018-03-27 PROCEDURE — 3725F SCREEN DEPRESSION PERFORMED: CPT | Performed by: FAMILY MEDICINE

## 2018-03-27 PROCEDURE — 99213 OFFICE O/P EST LOW 20 MIN: CPT | Performed by: FAMILY MEDICINE

## 2018-03-27 RX ORDER — CIPROFLOXACIN 500 MG/1
500 TABLET, FILM COATED ORAL EVERY 12 HOURS SCHEDULED
Qty: 10 TABLET | Refills: 0 | Status: SHIPPED | OUTPATIENT
Start: 2018-03-27 | End: 2018-04-01

## 2018-03-27 NOTE — PROGRESS NOTES
Assessment/Plan:    Recommend symptomatic care as directed  Recommend return to office for recheck if no improvement or worsening symptoms  Side effect profile of medication discussed as well  No problem-specific Assessment & Plan notes found for this encounter  Diagnoses and all orders for this visit:    Gastroenteritis  -     ciprofloxacin (CIPRO) 500 mg tablet; Take 1 tablet (500 mg total) by mouth every 12 (twelve) hours for 5 days          Subjective:      Patient ID: Lakeshia Case is a 29 y o  male  Patient here with 2 day history of nausea vomiting and diarrhea  Vomiting has subsided  No vomiting over the last 18 hours  He is keeping fluids down today  Occasional abdominal cramping but no back pain  No dysuria  Denies fevers, arthralgias or rashes  Denies any recent travel  Vomiting    Associated symptoms include diarrhea  Diarrhea    Associated symptoms include vomiting  The following portions of the patient's history were reviewed and updated as appropriate: allergies, current medications, past family history, past medical history, past social history, past surgical history and problem list     Review of Systems   Constitutional: Negative  HENT: Negative  Eyes: Negative  Respiratory: Negative  Cardiovascular: Negative  Gastrointestinal: Positive for diarrhea and vomiting  Endocrine: Negative  Genitourinary: Negative  Musculoskeletal: Negative  Skin: Negative  Allergic/Immunologic: Negative  Neurological: Negative  Hematological: Negative  Psychiatric/Behavioral: Negative  Objective:      /84 (BP Location: Left arm, Patient Position: Sitting, Cuff Size: Large)   Pulse (!) 117   Temp 98 5 °F (36 9 °C) (Tympanic)   Ht 5' 5" (1 651 m)   Wt 70 8 kg (156 lb)   BMI 25 96 kg/m²          Physical Exam   Constitutional: He is oriented to person, place, and time  He appears well-developed and well-nourished     HENT:   Head: Normocephalic and atraumatic  Right Ear: External ear normal  Tympanic membrane is not erythematous and not bulging  Left Ear: External ear normal  Tympanic membrane is not erythematous and not bulging  Nose: Nose normal    Mouth/Throat: Oropharynx is clear and moist and mucous membranes are normal  No oral lesions  No oropharyngeal exudate  Eyes: Conjunctivae and EOM are normal  Right eye exhibits no discharge  Left eye exhibits no discharge  No scleral icterus  Neck: Normal range of motion  Neck supple  No thyromegaly present  Cardiovascular: Normal rate, regular rhythm and normal heart sounds  Exam reveals no gallop and no friction rub  No murmur heard  Pulmonary/Chest: Effort normal  No respiratory distress  He has no wheezes  He has no rales  He exhibits no tenderness  Abdominal: Soft  Bowel sounds are normal  He exhibits no distension and no mass  There is no tenderness  There is no rebound and no guarding  Musculoskeletal: Normal range of motion  He exhibits no edema, tenderness or deformity  Lymphadenopathy:     He has no cervical adenopathy  Neurological: He is alert and oriented to person, place, and time  He has normal reflexes  No cranial nerve deficit  He exhibits normal muscle tone  Coordination normal    Skin: Skin is warm and dry  No rash noted  No erythema  No pallor  Psychiatric: He has a normal mood and affect  His behavior is normal    Vitals reviewed

## 2018-04-16 ENCOUNTER — OFFICE VISIT (OUTPATIENT)
Dept: FAMILY MEDICINE CLINIC | Facility: CLINIC | Age: 33
End: 2018-04-16
Payer: COMMERCIAL

## 2018-04-16 VITALS
OXYGEN SATURATION: 97 % | BODY MASS INDEX: 26.33 KG/M2 | HEIGHT: 65 IN | TEMPERATURE: 99.5 F | WEIGHT: 158 LBS | HEART RATE: 100 BPM | SYSTOLIC BLOOD PRESSURE: 130 MMHG | RESPIRATION RATE: 16 BRPM | DIASTOLIC BLOOD PRESSURE: 84 MMHG

## 2018-04-16 DIAGNOSIS — J01.00 ACUTE MAXILLARY SINUSITIS, RECURRENCE NOT SPECIFIED: Primary | ICD-10-CM

## 2018-04-16 PROCEDURE — 99213 OFFICE O/P EST LOW 20 MIN: CPT | Performed by: FAMILY MEDICINE

## 2018-04-16 RX ORDER — AZITHROMYCIN 250 MG/1
TABLET, FILM COATED ORAL
Qty: 6 TABLET | Refills: 0 | Status: SHIPPED | OUTPATIENT
Start: 2018-04-16 | End: 2018-04-21

## 2018-04-16 NOTE — PROGRESS NOTES
Assessment/Plan:  Patient will be started on a Z-Oni and instructed to take Mucinex  Patient may use Flonase, increase fluids and rest   Return to the office in 1 week or sooner p r n  No problem-specific Assessment & Plan notes found for this encounter  Diagnoses and all orders for this visit:    Acute maxillary sinusitis, recurrence not specified  Comments:  Z-Oni and Mucinex  Increase fluids and rest   Orders:  -     azithromycin (ZITHROMAX) 250 mg tablet; Take 2 tablets today then 1 tablet daily x 4 days          Subjective:      Patient ID: Shadi Hernández is a 29 y o  male  Patient started 4 days ago with cold symptoms  He complains of nasal congestion productive of yellow mucus, postnasal drainage and cough  Patient admits to sinus pressure headache and low-grade fever to 100  He has treated this with DayQuil and OTC sinus medication without significant relief  Fever   Associated symptoms include congestion, coughing, headaches and a sore throat  Generalized Body Aches   Associated symptoms include congestion, ear pain, headaches, rhinorrhea, a sore throat, a URI and coughing  Pertinent negatives include no wheezing  URI    This is a new problem  The current episode started in the past 7 days  The problem has been gradually worsening  The maximum temperature recorded prior to his arrival was 100 4 - 100 9 F  Associated symptoms include congestion, coughing, ear pain, headaches, a plugged ear sensation, rhinorrhea, sinus pain, sneezing and a sore throat  Pertinent negatives include no wheezing  The treatment provided mild relief  The following portions of the patient's history were reviewed and updated as appropriate: allergies, current medications, past family history, past medical history, past social history, past surgical history and problem list     Review of Systems   HENT: Positive for congestion, ear pain, rhinorrhea, sinus pain, sneezing and sore throat      Respiratory: Positive for cough  Negative for wheezing  Neurological: Positive for headaches  Objective:      /84 (BP Location: Left arm, Patient Position: Sitting, Cuff Size: Adult)   Pulse 100   Temp 99 5 °F (37 5 °C) (Tympanic)   Resp 16   Ht 5' 5" (1 651 m)   Wt 71 7 kg (158 lb)   SpO2 97%   BMI 26 29 kg/m²          Physical Exam   Constitutional: He is oriented to person, place, and time  He appears well-developed and well-nourished  No distress  HENT:   Head: Normocephalic  Right Ear: External ear normal    Left Ear: External ear normal    Positive turbinates swelling with mucoid purulent drainage  Throat positive postnasal drainage and injected  Eyes: Conjunctivae are normal    Neck: Neck supple  Cardiovascular: Normal rate and regular rhythm  Pulmonary/Chest: Effort normal and breath sounds normal  He has no wheezes  Abdominal: Soft  There is no tenderness  Musculoskeletal: He exhibits no edema  Lymphadenopathy:     He has no cervical adenopathy  Neurological: He is alert and oriented to person, place, and time  Skin: Skin is warm and dry  Psychiatric: He has a normal mood and affect

## 2018-04-18 DIAGNOSIS — M79.18 MYOFASCIAL PAIN SYNDROME: ICD-10-CM

## 2018-04-18 DIAGNOSIS — G89.29 CHRONIC RIGHT-SIDED THORACIC BACK PAIN: ICD-10-CM

## 2018-04-18 DIAGNOSIS — M54.6 CHRONIC RIGHT-SIDED THORACIC BACK PAIN: ICD-10-CM

## 2018-04-23 RX ORDER — METHOCARBAMOL 750 MG/1
TABLET, FILM COATED ORAL
Qty: 90 TABLET | Refills: 0 | OUTPATIENT
Start: 2018-04-23

## 2018-04-23 RX ORDER — NORTRIPTYLINE HYDROCHLORIDE 10 MG/1
CAPSULE ORAL
Qty: 90 CAPSULE | Refills: 0 | OUTPATIENT
Start: 2018-04-23

## 2018-04-24 NOTE — TELEPHONE ENCOUNTER
RN s/w pt regarding previous  Per pt he doesn't know why the pharmacy keeps sending these requests because he did not ask them to  Pt aware that we require the pt to call and request a refill when needed  Per pt he will require a refill of the nortriptyline as well as the methocarbamol and is taking them as they were ordered  Pt has a f/u appt with AO on 6/6 at 8:30 arrival time  --please advise thank you--    Pt will require a refill of nortriptyline as well as the mothocarbamol

## 2018-04-25 DIAGNOSIS — M54.6 CHRONIC RIGHT-SIDED THORACIC BACK PAIN: ICD-10-CM

## 2018-04-25 DIAGNOSIS — M79.18 MYOFASCIAL PAIN SYNDROME: ICD-10-CM

## 2018-04-25 DIAGNOSIS — G89.29 CHRONIC RIGHT-SIDED THORACIC BACK PAIN: ICD-10-CM

## 2018-04-25 RX ORDER — METHOCARBAMOL 750 MG/1
750 TABLET, FILM COATED ORAL EVERY 8 HOURS SCHEDULED
Qty: 90 TABLET | Refills: 1 | Status: SHIPPED | OUTPATIENT
Start: 2018-04-25 | End: 2018-05-21 | Stop reason: SDUPTHER

## 2018-04-25 RX ORDER — NORTRIPTYLINE HYDROCHLORIDE 10 MG/1
10 CAPSULE ORAL 3 TIMES DAILY
Qty: 90 CAPSULE | Refills: 1 | Status: SHIPPED | OUTPATIENT
Start: 2018-04-25 | End: 2018-05-21 | Stop reason: SDUPTHER

## 2018-05-18 DIAGNOSIS — M54.6 CHRONIC RIGHT-SIDED THORACIC BACK PAIN: ICD-10-CM

## 2018-05-18 DIAGNOSIS — G89.29 CHRONIC RIGHT-SIDED THORACIC BACK PAIN: ICD-10-CM

## 2018-05-18 DIAGNOSIS — M79.18 MYOFASCIAL PAIN SYNDROME: ICD-10-CM

## 2018-05-18 RX ORDER — METHOCARBAMOL 750 MG/1
TABLET, FILM COATED ORAL
Qty: 90 TABLET | Refills: 0 | Status: CANCELLED | OUTPATIENT
Start: 2018-05-18

## 2018-05-18 RX ORDER — NORTRIPTYLINE HYDROCHLORIDE 10 MG/1
CAPSULE ORAL
Qty: 90 CAPSULE | Refills: 0 | Status: CANCELLED | OUTPATIENT
Start: 2018-05-18

## 2018-05-21 ENCOUNTER — OFFICE VISIT (OUTPATIENT)
Dept: PAIN MEDICINE | Facility: MEDICAL CENTER | Age: 33
End: 2018-05-21
Payer: COMMERCIAL

## 2018-05-21 VITALS — WEIGHT: 149 LBS | SYSTOLIC BLOOD PRESSURE: 118 MMHG | BODY MASS INDEX: 24.79 KG/M2 | DIASTOLIC BLOOD PRESSURE: 60 MMHG

## 2018-05-21 DIAGNOSIS — M79.18 MYOFASCIAL PAIN SYNDROME: ICD-10-CM

## 2018-05-21 DIAGNOSIS — M54.6 CHRONIC RIGHT-SIDED THORACIC BACK PAIN: ICD-10-CM

## 2018-05-21 DIAGNOSIS — M54.6 ACUTE RIGHT-SIDED THORACIC BACK PAIN: Primary | ICD-10-CM

## 2018-05-21 DIAGNOSIS — G89.29 CHRONIC RIGHT-SIDED THORACIC BACK PAIN: ICD-10-CM

## 2018-05-21 PROCEDURE — 99213 OFFICE O/P EST LOW 20 MIN: CPT | Performed by: NURSE PRACTITIONER

## 2018-05-21 RX ORDER — METHYLPREDNISOLONE 4 MG/1
TABLET ORAL
Qty: 21 TABLET | Refills: 0 | Status: SHIPPED | OUTPATIENT
Start: 2018-05-21 | End: 2018-05-21 | Stop reason: SDUPTHER

## 2018-05-21 RX ORDER — METHYLPREDNISOLONE 4 MG/1
TABLET ORAL
Qty: 21 TABLET | Refills: 0 | Status: SHIPPED | OUTPATIENT
Start: 2018-05-21 | End: 2018-06-13

## 2018-05-21 RX ORDER — NORTRIPTYLINE HYDROCHLORIDE 10 MG/1
10 CAPSULE ORAL 3 TIMES DAILY
Qty: 90 CAPSULE | Refills: 1 | Status: SHIPPED | OUTPATIENT
Start: 2018-05-21 | End: 2018-07-16 | Stop reason: SDUPTHER

## 2018-05-21 RX ORDER — METHOCARBAMOL 750 MG/1
TABLET, FILM COATED ORAL
COMMUNITY
End: 2018-05-21

## 2018-05-21 RX ORDER — METHOCARBAMOL 750 MG/1
750 TABLET, FILM COATED ORAL EVERY 8 HOURS SCHEDULED
Qty: 90 TABLET | Refills: 1 | Status: SHIPPED | OUTPATIENT
Start: 2018-05-21 | End: 2018-05-21 | Stop reason: SDUPTHER

## 2018-05-21 RX ORDER — NORTRIPTYLINE HYDROCHLORIDE 10 MG/1
10 CAPSULE ORAL 3 TIMES DAILY
Qty: 90 CAPSULE | Refills: 1 | Status: SHIPPED | OUTPATIENT
Start: 2018-05-21 | End: 2018-05-21 | Stop reason: SDUPTHER

## 2018-05-21 RX ORDER — METHOCARBAMOL 750 MG/1
750 TABLET, FILM COATED ORAL EVERY 8 HOURS SCHEDULED
Qty: 90 TABLET | Refills: 1 | Status: SHIPPED | OUTPATIENT
Start: 2018-05-21 | End: 2018-07-16 | Stop reason: ALTCHOICE

## 2018-05-21 NOTE — PROGRESS NOTES
Pt is c/o of right sided upper back pain  Assessment:  1  Acute right-sided thoracic back pain    2  Chronic right-sided thoracic back pain    3  Myofascial pain syndrome        Plan:  I would like the patient to continue with the methocarbamol in the Nortriptyline and the naproxen as prescribed  These medications were refilled for him today  Since the patient is having an acute flare up of his right thoracic back pain over the past couple days I feel it is reasonable to initiate a Medrol Dosepak for his pain symptoms  The patient is aware not to take the naproxen while he is using the steroid Dosepak  Patient verbalized understanding  Continue with massage therapy    Follow-up visit in 8 weeks to re-evaluate his pain symptoms        [place] Prescription Drug Monitoring Program report was reviewed and was appropriate       History of Present Illness: The patient is a 29 y o  male who presents for a follow up office visit in regards to Back Pain (upper right)  The patients current symptoms include right thoracic back pain rated 9/10, this is constant in nature most bothersome at night  He describes his pain as sharp, throbbing, pressure-like, and shooting  The patient states that his pain symptoms flared up over the past couple days  He tells me that his wife was going to take him to the ER if his pain was so bad and it is making it hard for him to breathe as it is so painful  Current pain medications includes:  Methocarbamol 750 mg 3 times daily, nortriptyline 10 mg 3 tablets at bedtime, and naproxen twice daily  The patient reports that this regimen is providing 10% pain relief  The patient is reporting no side effects from this pain medication regimen  Patient continues with massage therapy once a week      I have personally reviewed and/or updated the patient's past medical history, past surgical history, family history, social history, current medications, allergies, and vital signs today          Review of Systems  Review of Systems   Cardiovascular: Negative for chest pain  Gastrointestinal: Positive for vomiting  Negative for constipation, diarrhea and nausea  Musculoskeletal: Negative for arthralgias, gait problem, joint swelling and myalgias  Difficulty walking  Decreased rom     Skin: Negative for rash  Neurological: Negative for dizziness, seizures and weakness  All other systems reviewed and are negative  Past Medical History:   Diagnosis Date    [address]ism ([facility] [address] )     Chronic pain syndrome     Depression     GERD (gastroesophageal reflux disease)     Migraine     Rhabdomyolysis        Past Surgical History:   Procedure Laterality Date    BACK SURGERY      spine repair/fusion/refusion of 2-8 vertebrae    [place] ESOPHAGOGASTRODUODENOSCOPY TRANSORAL DIAGNOSTIC N/A 5/1/2017    Procedure: ESOPHAGOGASTRODUODENOSCOPY (EGD); Surgeon: Valorie Lopez MD;  Location: [facility]; Service: Gastroenterology       No family history on file  Social History     Occupational History    Not on file       Social History Main Topics    Smoking status: Former Smoker    Smokeless tobacco: Never Used    Alcohol use No    Drug use: No    Sexual activity: Not on file         Current Outpatient Prescriptions:     methocarbamol (ROBAXIN) 750 mg tablet, Take 1 tablet (750 mg total) by mouth every 8 (eight) hours, Disp: 90 tablet, Rfl: 1    naproxen sodium (ALEVE) 220 MG tablet, Take 1 tablet (220 mg total) by mouth 2 (two) times a day with meals, Disp: 60 tablet, Rfl: 2    nortriptyline (PAMELOR) 10 mg capsule, Take 1 capsule (10 mg total) by mouth 3 (three) times a day 3 tablets at bedtime, Disp: 90 capsule, Rfl: 1    Omeprazole (RA OMEPRAZOLE) 20 MG TBEC, Take 1 capsule by mouth 2 (two) times a day, Disp: , Rfl:     Methylprednisolone 4 MG TBPK, Use as directed on package, Disp: 21 tablet, Rfl: 0    Allergies   Allergen Reactions    Augmentin [Amoxicillin-Pot Clavulanate] Other (See Comments)     AUGMENTIN rash    Other Hives     Reaction Date: [id number];        Physical Exam:    /60   Wt 67 6 kg (149 lb)   BMI 24 79 kg/m²     Constitutional:normal, well developed, well nourished, alert, in no distress and non-toxic and no overt pain behavior  Eyes:anicteric  HEENT:grossly intact  Neck:supple, symmetric, trachea midline and no masses   Pulmonary:even and unlabored  Cardiovascular:No edema or pitting edema present  Skin:Normal without rashes or lesions and well hydrated  Psychiatric:Mood and affect appropriate  Neurologic:Cranial Nerves II-XII grossly intact  Musculoskeletal:normal   Thoracic Spine Exam    Appearance:  Normal lordosis  Palpation/Tenderness:  right thoracic paraspinal tenderness  right thoracic spasm                Imaging  No orders to display     XR entire spine (scoliosis) 2-3 vw   Status: Final result   PACS Images     Show images for XR entire spine (scoliosis) 2-3 vw   Order Report      Order Details   Study Result     SCOLIOSIS      INDICATION: Back pain, muscle tightness     COMPARISON: 7/19/2014     VIEWS:  Erect PA and lateral views thoracolumbar spine     IMAGES:  8     FINDINGS:     There is no acute fracture  No osseous lesion appreciated      The patient is status post spinal fusion which extends from approximately the level of T4 through the thoracolumbar junction  The utilized hardware appears intact  Intervertebral cages are placed at the lower levels and appear unchanged in position   from prior     There is levoscoliosis of the lumbar spine  When measuring from the inferior endplate of X30 to the inferior endplate of L5 with Azevedo angle is approximately 17 degrees      IMPRESSION:        1  No acute osseous abnormality  2  Levoscoliosis of the lumbar spine          No orders of the defined types were placed in this encounter

## 2018-05-31 ENCOUNTER — OFFICE VISIT (OUTPATIENT)
Dept: FAMILY MEDICINE CLINIC | Facility: CLINIC | Age: 33
End: 2018-05-31
Payer: COMMERCIAL

## 2018-05-31 VITALS
WEIGHT: 155 LBS | HEART RATE: 123 BPM | HEIGHT: 66 IN | OXYGEN SATURATION: 98 % | DIASTOLIC BLOOD PRESSURE: 80 MMHG | TEMPERATURE: 97.9 F | BODY MASS INDEX: 24.91 KG/M2 | SYSTOLIC BLOOD PRESSURE: 110 MMHG

## 2018-05-31 DIAGNOSIS — M54.6 CHRONIC RIGHT-SIDED THORACIC BACK PAIN: ICD-10-CM

## 2018-05-31 DIAGNOSIS — R32 URINARY INCONTINENCE, UNSPECIFIED TYPE: Primary | ICD-10-CM

## 2018-05-31 DIAGNOSIS — M41.20 SCOLIOSIS (AND KYPHOSCOLIOSIS), IDIOPATHIC: ICD-10-CM

## 2018-05-31 DIAGNOSIS — G89.29 CHRONIC RIGHT-SIDED THORACIC BACK PAIN: ICD-10-CM

## 2018-05-31 PROCEDURE — 99213 OFFICE O/P EST LOW 20 MIN: CPT | Performed by: FAMILY MEDICINE

## 2018-05-31 RX ORDER — PREDNISONE 10 MG/1
TABLET ORAL
Qty: 33 TABLET | Refills: 0 | Status: SHIPPED | OUTPATIENT
Start: 2018-05-31 | End: 2018-06-13

## 2018-06-04 NOTE — PROGRESS NOTES
Assessment/Plan:  No significant findings on physical exam   However considering urinary incontinence issues recommended MRI  He states he has had MRIs in the past and has titanium rods and has been able to tolerate the MRIs in the past   Also recommend follow-up with Urology considering recent urinary incontinence  He will call with any new or worsening symptoms or new symptoms in the interim  No problem-specific Assessment & Plan notes found for this encounter  Diagnoses and all orders for this visit:    Urinary incontinence, unspecified type  -     MRI lumbar spine wo contrast; Future  -     Ambulatory referral to Urology; Future    Scoliosis (and kyphoscoliosis), idiopathic  -     MRI lumbar spine wo contrast; Future  -     predniSONE 10 mg tablet; 6 tablets daily, all at one time, with food  Then on day #4 decrease by 1 pill each day until finished  -     Ambulatory referral to Urology; Future    Chronic right-sided thoracic back pain  -     MRI lumbar spine wo contrast; Future  -     predniSONE 10 mg tablet; 6 tablets daily, all at one time, with food  Then on day #4 decrease by 1 pill each day until finished  Subjective:      Patient ID: Benedetto Bloch is a 29 y o  male  Patient with history of thoracic scoliosis and has been following with pain management specialist intermittently  Over the last 1 week he developed some urinary incontinence symptoms intermittently  No prior history of urinary incontinence  Thoracic discomfort is mild-to-moderate  He has occasional lumbar pain but no history of cauda equina syndrome  Denies any bowel incontinence  No abdominal pain  No nighttime incontinence or nocturia  Denies arthralgias fevers or chills        Back Pain         The following portions of the patient's history were reviewed and updated as appropriate: allergies, current medications, past family history, past medical history, past social history, past surgical history and problem list     Review of Systems   Constitutional: Negative  HENT: Negative  Eyes: Negative  Respiratory: Negative  Cardiovascular: Negative  Gastrointestinal: Negative  Endocrine: Negative  Genitourinary:        As noted in history of present illness   Musculoskeletal: Positive for back pain  Skin: Negative  Allergic/Immunologic: Negative  Neurological: Negative  Hematological: Negative  Psychiatric/Behavioral: Negative  Objective:      /80 (BP Location: Left arm, Patient Position: Sitting, Cuff Size: Adult)   Pulse (!) 123   Temp 97 9 °F (36 6 °C)   Ht 5' 5 75" (1 67 m)   Wt 70 3 kg (155 lb)   SpO2 98%   BMI 25 21 kg/m²          Physical Exam   Constitutional: He is oriented to person, place, and time  He appears well-developed and well-nourished  HENT:   Head: Normocephalic and atraumatic  Right Ear: External ear normal  Tympanic membrane is not erythematous and not bulging  Left Ear: External ear normal  Tympanic membrane is not erythematous and not bulging  Nose: Nose normal    Mouth/Throat: Oropharynx is clear and moist and mucous membranes are normal  No oral lesions  No oropharyngeal exudate  Eyes: Conjunctivae and EOM are normal  Right eye exhibits no discharge  Left eye exhibits no discharge  No scleral icterus  Neck: Normal range of motion  Neck supple  No thyromegaly present  Cardiovascular: Normal rate, regular rhythm and normal heart sounds  Exam reveals no gallop and no friction rub  No murmur heard  Pulmonary/Chest: Effort normal  No respiratory distress  He has no wheezes  He has no rales  He exhibits no tenderness  Abdominal: Soft  Bowel sounds are normal  He exhibits no distension and no mass  There is no tenderness  There is no rebound and no guarding  Musculoskeletal: Normal range of motion  He exhibits no edema, tenderness or deformity  Lymphadenopathy:     He has no cervical adenopathy     Neurological: He is alert and oriented to person, place, and time  He has normal reflexes  No cranial nerve deficit  He exhibits normal muscle tone  Coordination normal    Skin: Skin is warm and dry  No rash noted  No erythema  No pallor  Psychiatric: He has a normal mood and affect  His behavior is normal    Vitals reviewed

## 2018-06-13 ENCOUNTER — OFFICE VISIT (OUTPATIENT)
Dept: UROLOGY | Facility: MEDICAL CENTER | Age: 33
End: 2018-06-13
Payer: COMMERCIAL

## 2018-06-13 VITALS
BODY MASS INDEX: 26.63 KG/M2 | HEIGHT: 64 IN | SYSTOLIC BLOOD PRESSURE: 122 MMHG | WEIGHT: 156 LBS | DIASTOLIC BLOOD PRESSURE: 80 MMHG

## 2018-06-13 DIAGNOSIS — M41.20 SCOLIOSIS (AND KYPHOSCOLIOSIS), IDIOPATHIC: ICD-10-CM

## 2018-06-13 DIAGNOSIS — R33.9 URINARY RETENTION: ICD-10-CM

## 2018-06-13 DIAGNOSIS — R32 URINARY INCONTINENCE, UNSPECIFIED TYPE: Primary | ICD-10-CM

## 2018-06-13 LAB
POST-VOID RESIDUAL VOLUME, ML POC: 286 ML
SL AMB  POCT GLUCOSE, UA: NEGATIVE
SL AMB LEUKOCYTE ESTERASE,UA: NEGATIVE
SL AMB POCT BILIRUBIN,UA: NEGATIVE
SL AMB POCT BLOOD,UA: NEGATIVE
SL AMB POCT CLARITY,UA: CLEAR
SL AMB POCT COLOR,UA: YELLOW
SL AMB POCT KETONES,UA: NEGATIVE
SL AMB POCT NITRITE,UA: NEGATIVE
SL AMB POCT PH,UA: 7
SL AMB POCT SPECIFIC GRAVITY,UA: 1.02
SL AMB POCT URINE PROTEIN: NEGATIVE
SL AMB POCT UROBILINOGEN: 0.2

## 2018-06-13 PROCEDURE — 51798 US URINE CAPACITY MEASURE: CPT | Performed by: UROLOGY

## 2018-06-13 PROCEDURE — 99203 OFFICE O/P NEW LOW 30 MIN: CPT | Performed by: UROLOGY

## 2018-06-13 PROCEDURE — 81003 URINALYSIS AUTO W/O SCOPE: CPT | Performed by: UROLOGY

## 2018-06-13 NOTE — PROGRESS NOTES
Assessment/Plan:    Urinary retention  Discovered today  The patient has either a hypotonic bladder or an obstruction or both  The patient is certainly too young for prostatic obstruction however he could have urethral stricture  He will return in the near future for cystoscopy  Urinary incontinence  Etiology unclear but possibly caused by the patient's urinary retention  Workup in progress  Diagnoses and all orders for this visit:    Urinary incontinence, unspecified type  -     Ambulatory referral to Urology  -     POCT urine dip auto non-scope  -     POCT Measure PVR    Urinary retention    Scoliosis (and kyphoscoliosis), idiopathic  -     Ambulatory referral to Urology          Subjective:      Patient ID: Marina Soto is a 29 y o  male  HPI  Urinary incontinence:  Chronic scoliosis  Chronic back pain  Had a flare of back pain and that caused bladder leakage without bladder sensation or pain  No change in voiding habits  Voids with good force and control  No prior hx  No UTI sx  U/A nl  No symptoms to suggest recent neurological deficits in the lumbar or sacral nerve distribution  The patient has had urinary catheters on multiple occasions related to his spinal injury  He knows of no episodes of difficulty placing the catheter and he has always established a satisfactory voiding pattern following catheter removal     Urinary retention:  On today's physical exam, he was found to have a post-void residual of 286 m L  Has no prior history of retention  The following portions of the patient's history were reviewed and updated as appropriate: allergies, current medications, past family history, past medical history, past social history, past surgical history and problem list     Review of Systems   Constitutional: Negative for activity change and fatigue  Respiratory: Negative for shortness of breath and wheezing  Cardiovascular: Negative for chest pain     Gastrointestinal: Negative for abdominal pain  Genitourinary: Negative for difficulty urinating, dysuria, frequency, hematuria and urgency  Musculoskeletal: Negative for back pain and gait problem  [place]ife of scoliosis status post multiple surgeries  Skin: Negative  Allergic/Immunologic: Negative  Neurological: Negative  Psychiatric/Behavioral: Negative  Objective:      /80 (BP Location: Left arm, Patient Position: Sitting, Cuff Size: Standard)   Ht 5' 4" (1 626 m)   Wt 70 8 kg (156 lb)   BMI 26 78 kg/m²          Physical Exam   Constitutional: He is oriented to person, place, and time  He appears well-developed and well-nourished  HENT:   Head: Normocephalic and atraumatic  Neck: Normal range of motion  Neck supple  Pulmonary/Chest: Effort normal    Abdominal: Soft  Bowel sounds are normal  He exhibits no mass  There is no tenderness  Genitourinary:   Genitourinary Comments: Genital and rectal exam is not performed  Musculoskeletal: Normal range of motion  Neurological: He is alert and oriented to person, place, and time  He has normal reflexes  Skin: Skin is warm and dry  Psychiatric: He has a normal mood and affect   His behavior is normal  Judgment and thought content normal

## 2018-06-13 NOTE — ASSESSMENT & PLAN NOTE
Discovered today  The patient has either a hypotonic bladder or an obstruction or both  The patient is certainly too young for prostatic obstruction however he could have urethral stricture  He will return in the near future for cystoscopy

## 2018-06-13 NOTE — LETTER
June 13, 2018     Tacho Bryant, [address] [facility],[address]  [id number] [place] [address]    Patient: Aletha Poole   YOB: 1985   Date of Visit: 6/13/2018       Dear Dr Velazquez Tolu: Thank you for referring Nafisa Jose to me for evaluation  Below are my notes for this consultation  If you have questions, please do not hesitate to call me  I look forward to following your patient along with you  Sincerely,        Eyad Lara MD        CC: No Recipients  Eyad Lara MD  6/13/2018  2:09 PM  Sign at close encounter  Assessment/Plan:    Urinary retention  Discovered today  The patient has either a hypotonic bladder or an obstruction or both  The patient is certainly too young for prostatic obstruction however he could have urethral stricture  He will return in the near future for cystoscopy  Urinary incontinence  Etiology unclear but possibly caused by the patient's urinary retention  Workup in progress  Diagnoses and all orders for this visit:    Urinary incontinence, unspecified type  -     Ambulatory referral to Urology  -     POCT urine dip auto non-scope  -     POCT Measure PVR    Urinary retention    Scoliosis (and kyphoscoliosis), idiopathic  -     Ambulatory referral to Urology          Subjective:      Patient ID: Aletha Poole is a 29 y o  male  HPI  Urinary incontinence:  Chronic scoliosis  Chronic back pain  Had a flare of back pain and that caused bladder leakage without bladder sensation or pain  No change in voiding habits  Voids with good force and control  No prior hx  No UTI sx  U/A nl  No symptoms to suggest recent neurological deficits in the lumbar or sacral nerve distribution  The patient has had urinary catheters on multiple occasions related to his spinal injury  He knows of no episodes of difficulty placing the catheter and he has always established a satisfactory voiding pattern following catheter removal     Urinary retention:   On today's physical exam, he was found to have a post-void residual of 286 m L  Has no prior history of retention  The following portions of the patient's history were reviewed and updated as appropriate: allergies, current medications, past family history, past medical history, past social history, past surgical history and problem list     Review of Systems   Constitutional: Negative for activity change and fatigue  Respiratory: Negative for shortness of breath and wheezing  Cardiovascular: Negative for chest pain  Gastrointestinal: Negative for abdominal pain  Genitourinary: Negative for difficulty urinating, dysuria, frequency, hematuria and urgency  Musculoskeletal: Negative for back pain and gait problem  [place]ife of scoliosis status post multiple surgeries  Skin: Negative  Allergic/Immunologic: Negative  Neurological: Negative  Psychiatric/Behavioral: Negative  Objective:      /80 (BP Location: Left arm, Patient Position: Sitting, Cuff Size: Standard)   Ht 5' 4" (1 626 m)   Wt 70 8 kg (156 lb)   BMI 26 78 kg/m²           Physical Exam   Constitutional: He is oriented to person, place, and time  He appears well-developed and well-nourished  HENT:   Head: Normocephalic and atraumatic  Neck: Normal range of motion  Neck supple  Pulmonary/Chest: Effort normal    Abdominal: Soft  Bowel sounds are normal  He exhibits no mass  There is no tenderness  Genitourinary:   Genitourinary Comments: Genital and rectal exam is not performed  Musculoskeletal: Normal range of motion  Neurological: He is alert and oriented to person, place, and time  He has normal reflexes  Skin: Skin is warm and dry  Psychiatric: He has a normal mood and affect   His behavior is normal  Judgment and thought content normal

## 2018-06-17 DIAGNOSIS — M54.6 CHRONIC RIGHT-SIDED THORACIC BACK PAIN: ICD-10-CM

## 2018-06-17 DIAGNOSIS — G89.29 CHRONIC RIGHT-SIDED THORACIC BACK PAIN: ICD-10-CM

## 2018-06-18 RX ORDER — NORTRIPTYLINE HYDROCHLORIDE 10 MG/1
CAPSULE ORAL
Qty: 90 CAPSULE | Refills: 0 | OUTPATIENT
Start: 2018-06-18

## 2018-06-18 NOTE — TELEPHONE ENCOUNTER
S/W Sarah at [facility]  Advised her script was escribed to 5/21/18 with [facility]     S/W pt  Advised him of the same and the above  Pt stated he does not need a refill at this time  Advised pt he needs to call [facility] 48 hrs prior to running out to request a refill b/c [facility] does not accept refill requests from pharmacies  Pt verbalized understanding

## 2018-06-20 ENCOUNTER — PROCEDURE VISIT (OUTPATIENT)
Dept: UROLOGY | Facility: MEDICAL CENTER | Age: 33
End: 2018-06-20
Payer: COMMERCIAL

## 2018-06-20 VITALS
WEIGHT: 145 LBS | SYSTOLIC BLOOD PRESSURE: 122 MMHG | BODY MASS INDEX: 24.75 KG/M2 | HEIGHT: 64 IN | DIASTOLIC BLOOD PRESSURE: 84 MMHG

## 2018-06-20 DIAGNOSIS — Z71.89 OTHER SPECIFIED COUNSELING: ICD-10-CM

## 2018-06-20 DIAGNOSIS — N39.498 OTHER URINARY INCONTINENCE: Primary | ICD-10-CM

## 2018-06-20 LAB
POST-VOID RESIDUAL VOLUME, ML POC: 7 ML
SL AMB  POCT GLUCOSE, UA: NEGATIVE
SL AMB LEUKOCYTE ESTERASE,UA: NEGATIVE
SL AMB POCT BILIRUBIN,UA: NEGATIVE
SL AMB POCT BLOOD,UA: NEGATIVE
SL AMB POCT CLARITY,UA: CLEAR
SL AMB POCT COLOR,UA: YELLOW
SL AMB POCT KETONES,UA: NEGATIVE
SL AMB POCT NITRITE,UA: NEGATIVE
SL AMB POCT PH,UA: 8.5
SL AMB POCT SPECIFIC GRAVITY,UA: 1.01
SL AMB POCT URINE PROTEIN: NEGATIVE
SL AMB POCT UROBILINOGEN: 0.2

## 2018-06-20 PROCEDURE — 51798 US URINE CAPACITY MEASURE: CPT | Performed by: UROLOGY

## 2018-06-20 PROCEDURE — 81003 URINALYSIS AUTO W/O SCOPE: CPT | Performed by: UROLOGY

## 2018-06-20 PROCEDURE — 99213 OFFICE O/P EST LOW 20 MIN: CPT | Performed by: UROLOGY

## 2018-06-20 PROCEDURE — 52000 CYSTOURETHROSCOPY: CPT | Performed by: UROLOGY

## 2018-06-20 NOTE — LETTER
June 20, 2018     Cole Maloney, [address] [facility],[address]  [address] [place] [address]    Patient: Dayo Blanton   YOB: 1985   Date of Visit: 6/20/2018       Dear Dr Reich Body: Thank you for referring Aliza Broussard to me for evaluation  Below are my notes for this consultation  If you have questions, please do not hesitate to call me  I look forward to following your patient along with you  Sincerely,        Natalie Richey MD        CC: No Recipients  Natalie Richey MD  6/20/2018 11:49 AM  Sign at close encounter  Procedures  MALE FLEXIBLE CYSTOSCOPY    Preoperative diagnosis:    Urinary incontinence, urinary retention    Postoperative diagnosis:   Normal exam    Operation:   Flexible cystoscopy    Surgeon:  Ami Louis MD,FACS    Anesthesia:  Xylocaine jelly    Procedure:  Patient was brought to the cystoscopy room and positively identified  The patient was prepped and draped in sterile fashion  Ten mL of 1% xylocaine jelly was instilled into the urethra  A penile clamp was applied  The flexible cystoscope was inserted  Findings are as follows:    Penile Urethra:normal  Bulbar Urethra:normal  Prostate:   normal  Bladder: The bladder neck is normal  Ureteral orifices are in normal  position  The mucosa is  normal    There is  no trabeculation  The cystoscope was removed  The patient tolerated the procedure well  Following additional consultation to review the findings with the patient, he was discharged from the office in satisfactory condition  Impression:  Normal examination        Natalie Richey MD  6/20/2018  4:14 PM  Sign at close encounter  Assessment/Plan:    Urinary incontinence    The patient's incontinence has resolved spontaneously  No additional treatment planned         Diagnoses and all orders for this visit:    Other urinary incontinence  -     POCT urine dip auto non-scope  -     POCT Measure PVR    Other specified counseling          Subjective: Patient ID: Rene Hogn is a 29 y o  male  HPI  Urinary incontinence: This has resolved spontaneously  Urinary retention:  Patient's residual urine today is 7  ML which is consistent with what I saw endoscopically  His residual last visit was 286 mL  The patient is accompanied by his spouse  The following portions of the patient's history were reviewed and updated as appropriate: allergies, current medications, past family history, past medical history, past social history, past surgical history and problem list     Review of Systems   Constitutional: Negative for activity change and fatigue  Respiratory: Negative for shortness of breath and wheezing  Cardiovascular: Negative for chest pain  Gastrointestinal: Negative for abdominal pain  Genitourinary: Negative for difficulty urinating, dysuria, frequency, hematuria and urgency  Musculoskeletal: Negative for back pain and gait problem  Kyphosis and scoliosis noted  Skin: Negative  Allergic/Immunologic: Negative  Neurological: Negative  Psychiatric/Behavioral: Negative  Objective:      /84 (BP Location: Left arm, Patient Position: Sitting, Cuff Size: Standard)   Ht 5' 4" (1 626 m)   Wt 65 8 kg (145 lb)   BMI 24 89 kg/m²           Physical Exam   Constitutional: He is oriented to person, place, and time  He appears well-developed and well-nourished  HENT:   Head: Normocephalic and atraumatic  Neck: Normal range of motion  Neck supple  Pulmonary/Chest: Effort normal    Genitourinary:   Genitourinary Comments:   External genitalia are normal    Musculoskeletal: Normal range of motion  Neurological: He is alert and oriented to person, place, and time  He has normal reflexes  Skin: Skin is warm and dry  Psychiatric: He has a normal mood and affect   His behavior is normal  Judgment and thought content normal

## 2018-06-20 NOTE — PROGRESS NOTES
Procedures  MALE FLEXIBLE CYSTOSCOPY    Preoperative diagnosis:    Urinary incontinence, urinary retention    Postoperative diagnosis:   Normal exam    Operation:   Flexible cystoscopy    Surgeon:  Dhruv Sutton MD,FACS    Anesthesia:  Xylocaine jelly    Procedure:  Patient was brought to the cystoscopy room and positively identified  The patient was prepped and draped in sterile fashion  Ten mL of 1% xylocaine jelly was instilled into the urethra  A penile clamp was applied  The flexible cystoscope was inserted  Findings are as follows:    Penile Urethra:normal  Bulbar Urethra:normal  Prostate:   normal  Bladder: The bladder neck is normal  Ureteral orifices are in normal  position  The mucosa is  normal    There is  no trabeculation  The cystoscope was removed  The patient tolerated the procedure well  Following additional consultation to review the findings with the patient, he was discharged from the office in satisfactory condition      Impression:  Normal examination

## 2018-06-20 NOTE — PROGRESS NOTES
Assessment/Plan:    Urinary incontinence    The patient's incontinence has resolved spontaneously  No additional treatment planned  Diagnoses and all orders for this visit:    Other urinary incontinence  -     POCT urine dip auto non-scope  -     POCT Measure PVR    Other specified counseling          Subjective:      Patient ID: Gaviota Jones is a 29 y o  male  HPI  Urinary incontinence: This has resolved spontaneously  Urinary retention:  Patient's residual urine today is 7  ML which is consistent with what I saw endoscopically  His residual last visit was 286 mL  The patient is accompanied by his spouse  The following portions of the patient's history were reviewed and updated as appropriate: allergies, current medications, past family history, past medical history, past social history, past surgical history and problem list     Review of Systems   Constitutional: Negative for activity change and fatigue  Respiratory: Negative for shortness of breath and wheezing  Cardiovascular: Negative for chest pain  Gastrointestinal: Negative for abdominal pain  Genitourinary: Negative for difficulty urinating, dysuria, frequency, hematuria and urgency  Musculoskeletal: Negative for back pain and gait problem  Kyphosis and scoliosis noted  Skin: Negative  Allergic/Immunologic: Negative  Neurological: Negative  Psychiatric/Behavioral: Negative  Objective:      /84 (BP Location: Left arm, Patient Position: Sitting, Cuff Size: Standard)   Ht 5' 4" (1 626 m)   Wt 65 8 kg (145 lb)   BMI 24 89 kg/m²          Physical Exam   Constitutional: He is oriented to person, place, and time  He appears well-developed and well-nourished  HENT:   Head: Normocephalic and atraumatic  Neck: Normal range of motion  Neck supple     Pulmonary/Chest: Effort normal    Genitourinary:   Genitourinary Comments:   External genitalia are normal    Musculoskeletal: Normal range of motion  Neurological: He is alert and oriented to person, place, and time  He has normal reflexes  Skin: Skin is warm and dry  Psychiatric: He has a normal mood and affect   His behavior is normal  Judgment and thought content normal

## 2018-06-27 ENCOUNTER — HOSPITAL ENCOUNTER (OUTPATIENT)
Dept: MRI IMAGING | Facility: HOSPITAL | Age: 33
Discharge: HOME | End: 2018-06-27
Payer: COMMERCIAL

## 2018-06-27 DIAGNOSIS — M54.6 CHRONIC RIGHT-SIDED THORACIC BACK PAIN: ICD-10-CM

## 2018-06-27 DIAGNOSIS — M41.20 SCOLIOSIS (AND KYPHOSCOLIOSIS), IDIOPATHIC: ICD-10-CM

## 2018-06-27 DIAGNOSIS — G89.29 CHRONIC RIGHT-SIDED THORACIC BACK PAIN: ICD-10-CM

## 2018-06-27 DIAGNOSIS — R32 URINARY INCONTINENCE, UNSPECIFIED TYPE: ICD-10-CM

## 2018-06-27 PROCEDURE — 72148 MRI LUMBAR SPINE W/O DYE: CPT

## 2018-07-08 DIAGNOSIS — M79.18 MYOFASCIAL PAIN SYNDROME: ICD-10-CM

## 2018-07-09 RX ORDER — METHOCARBAMOL 750 MG/1
750 TABLET, FILM COATED ORAL EVERY 8 HOURS SCHEDULED
Qty: 90 TABLET | Refills: 1 | OUTPATIENT
Start: 2018-07-09

## 2018-07-09 NOTE — TELEPHONE ENCOUNTER
S/W pt  Advised him of the previous task and that the pt needs to call [facility] 48 hrs in advance to request a medication refill  [facility]  Updated pt's pharmacy  Pt stated he does not need a refill of methocarbamol at this time  Confirmed his next appt w/ pt

## 2018-07-16 ENCOUNTER — OFFICE VISIT (OUTPATIENT)
Dept: PAIN MEDICINE | Facility: MEDICAL CENTER | Age: 33
End: 2018-07-16
Payer: COMMERCIAL

## 2018-07-16 ENCOUNTER — TELEPHONE (OUTPATIENT)
Dept: PAIN MEDICINE | Facility: MEDICAL CENTER | Age: 33
End: 2018-07-16

## 2018-07-16 VITALS
HEART RATE: 82 BPM | BODY MASS INDEX: 24.92 KG/M2 | DIASTOLIC BLOOD PRESSURE: 78 MMHG | SYSTOLIC BLOOD PRESSURE: 118 MMHG | HEIGHT: 64 IN | WEIGHT: 146 LBS

## 2018-07-16 DIAGNOSIS — M54.6 ACUTE RIGHT-SIDED THORACIC BACK PAIN: ICD-10-CM

## 2018-07-16 DIAGNOSIS — M79.18 MYOFASCIAL PAIN SYNDROME: Primary | ICD-10-CM

## 2018-07-16 DIAGNOSIS — G89.29 CHRONIC RIGHT-SIDED THORACIC BACK PAIN: ICD-10-CM

## 2018-07-16 DIAGNOSIS — M54.6 CHRONIC RIGHT-SIDED THORACIC BACK PAIN: ICD-10-CM

## 2018-07-16 DIAGNOSIS — G89.4 CHRONIC PAIN SYNDROME: ICD-10-CM

## 2018-07-16 PROCEDURE — 99213 OFFICE O/P EST LOW 20 MIN: CPT | Performed by: NURSE PRACTITIONER

## 2018-07-16 RX ORDER — BACLOFEN 10 MG/1
5 TABLET ORAL 3 TIMES DAILY
Qty: 90 TABLET | Refills: 0 | Status: SHIPPED | OUTPATIENT
Start: 2018-07-16 | End: 2018-08-20 | Stop reason: SDUPTHER

## 2018-07-16 RX ORDER — NAPROXEN SODIUM 220 MG
220 TABLET ORAL 2 TIMES DAILY WITH MEALS
Qty: 60 TABLET | Refills: 0 | Status: SHIPPED | OUTPATIENT
Start: 2018-07-16 | End: 2018-07-31

## 2018-07-16 RX ORDER — NORTRIPTYLINE HYDROCHLORIDE 25 MG/1
50 CAPSULE ORAL
Qty: 60 CAPSULE | Refills: 0 | Status: SHIPPED | OUTPATIENT
Start: 2018-07-16 | End: 2018-08-20 | Stop reason: SDUPTHER

## 2018-07-16 NOTE — TELEPHONE ENCOUNTER
Citlali [place] from pharmacy called and left  7/16 @ 2:13p stating she needs clarification on the sig for Nortriptyline   They can be reached at [phone]

## 2018-07-16 NOTE — PROGRESS NOTES
Assessment:  1  Myofascial pain syndrome    2  Chronic right-sided thoracic back pain    3  Chronic pain syndrome    4  Acute right-sided thoracic back pain        Plan:  I did have a very long discussion with the patient today regarding the fact that we have tried multiple conservative treatment options and not getting much relief  He continues to go to massage therapy every other week  We will change his methocarbamol to a different muscle relaxer that he has not tried such as baclofen 10 mg 3 times a day    I will also further increases nortriptyline to 25 mg tablets I would like him to take 2 at bedtime  He can continue to take naproxen this was refilled today  I did offer him to have trigger point injections again with ultrasound guidance with Dr Rajat Joseph he states that he did not get much relief from these originally so he does not wish to have them at this time  Will follow up with the patient in 4 weeks to re-evaluate his response to medication changes  If no better would consider a thoracic spine MRI    Patient tells me that he is now having new onset low back and right leg pain and weakness he states that he did have a lumbar spine MRI  Since this is new pain I would like him to follow up with Dr Rajat Joseph to be evaluated  [place] Prescription Drug Monitoring Program report was reviewed and was appropriate       History of Present Illness: The patient is a 29 y o  male who presents for a follow up office visit in regards to Back Pain  The patients current symptoms include right upper back and arm pain rated 9/10, this is constant in nature and bothersome throughout the entirety of the day  He describes his pain as sharp, pressure-like, shooting, numbness, and pins and needles  Current pain medications includes:  Methocarbamol 750 mg 3 times a day, nortriptyline 30 mg at bedtime, and naproxen 500 mg twice a day    The patient reports that this regimen is providing 10 % pain relief  The patient is reporting no side effects from this pain medication regimen  I have personally reviewed and/or updated the patient's past medical history, past surgical history, family history, social history, current medications, allergies, and vital signs today  Review of Systems  Review of Systems   Respiratory: Positive for shortness of breath  Cardiovascular: Negative for chest pain  Gastrointestinal: Positive for nausea and vomiting  Negative for constipation and diarrhea  Musculoskeletal: Positive for gait problem and joint swelling  Negative for arthralgias and myalgias  Skin: Negative for rash  Neurological: Positive for dizziness and weakness  Negative for seizures  All other systems reviewed and are negative  Past Medical History:   Diagnosis Date    Alcoholism ([facility] [place] [address] )     Chronic pain syndrome     Depression     GERD (gastroesophageal reflux disease)     Migraine     Rhabdomyolysis     Scoliosis     Urinary incontinence     Weight loss        Past Surgical History:   Procedure Laterality Date    BACK SURGERY  2011    remove screws    RI ESOPHAGOGASTRODUODENOSCOPY TRANSORAL DIAGNOSTIC N/A 5/1/2017    Procedure: ESOPHAGOGASTRODUODENOSCOPY (EGD); Surgeon: Cortney Frias MD;  [facility]; Service: Gastroenterology    SPINAL FUSION  2001, 2009       Family History   Problem Relation Age of Onset    Hypertension Mother     Breast cancer Mother        Social History     Occupational History    [employer]      Social History Main Topics    Smoking status: Former Smoker    Smokeless tobacco: Never Used    Alcohol use No      Comment: Former alcoholic      Drug use: No    Sexual activity: Not on file         Current Outpatient Prescriptions:     baclofen 10 mg tablet, Take 0 5 tablets (5 mg total) by mouth 3 (three) times a day, Disp: 90 tablet, Rfl: 0    naproxen sodium (ALEVE) 220 MG tablet, Take 1 tablet (220 mg total) by mouth 2 (two) times a day with meals, Disp: 60 tablet, Rfl: 0    nortriptyline (PAMELOR) 25 mg capsule, Take 2 capsules (50 mg total) by mouth daily at bedtime 3 tablets at bedtime, Disp: 60 capsule, Rfl: 0    Omeprazole (RA OMEPRAZOLE) 20 MG TBEC, Take 1 capsule by mouth 2 (two) times a day, Disp: , Rfl:     Allergies   Allergen Reactions    Pregabalin     Augmentin [Amoxicillin-Pot Clavulanate] Other (See Comments) and Rash     AUGMENTIN rash       Physical Exam:    /78   Pulse 82   Ht 5' 4" (1 626 m)   Wt 66 2 kg (146 lb)   BMI 25 06 kg/m²     Constitutional:normal, well developed, well nourished, alert, in no distress and non-toxic and no overt pain behavior  Eyes:anicteric  HEENT:grossly intact  Neck:supple, symmetric, trachea midline and no masses   Pulmonary:even and unlabored  Cardiovascular:No edema or pitting edema present  Skin:Normal without rashes or lesions and well hydrated  Psychiatric:Mood and affect appropriate  Neurologic:Cranial Nerves II-XII grossly intact  Musculoskeletal:normal and ambulates with cane    Imaging  No orders to display       No orders of the defined types were placed in this encounter

## 2018-07-16 NOTE — TELEPHONE ENCOUNTER
--FYI--    RN s/w Bethanie Quintero, pharmacist   RN able to read office note per AO from today advising increase of nortriptyline 25 mg tablets take 2 at HS  Bethanie Quintero appreciative of same

## 2018-07-18 RX ORDER — NORTRIPTYLINE HYDROCHLORIDE 10 MG/1
CAPSULE ORAL
Qty: 90 CAPSULE | Refills: 0 | OUTPATIENT
Start: 2018-07-18

## 2018-07-31 ENCOUNTER — HOSPITAL ENCOUNTER (EMERGENCY)
Facility: HOSPITAL | Age: 33
Discharge: HOME | End: 2018-07-31
Attending: EMERGENCY MEDICINE | Admitting: EMERGENCY MEDICINE
Payer: COMMERCIAL

## 2018-07-31 ENCOUNTER — APPOINTMENT (EMERGENCY)
Dept: CT IMAGING | Facility: HOSPITAL | Age: 33
End: 2018-07-31
Payer: COMMERCIAL

## 2018-07-31 VITALS
HEART RATE: 97 BPM | TEMPERATURE: 97.8 F | WEIGHT: 145 LBS | SYSTOLIC BLOOD PRESSURE: 133 MMHG | OXYGEN SATURATION: 98 % | RESPIRATION RATE: 20 BRPM | BODY MASS INDEX: 24.89 KG/M2 | DIASTOLIC BLOOD PRESSURE: 89 MMHG

## 2018-07-31 DIAGNOSIS — G89.29 CHRONIC PAIN: Primary | ICD-10-CM

## 2018-07-31 PROCEDURE — 96372 THER/PROPH/DIAG INJ SC/IM: CPT

## 2018-07-31 PROCEDURE — 99284 EMERGENCY DEPT VISIT MOD MDM: CPT

## 2018-07-31 PROCEDURE — 70450 CT HEAD/BRAIN W/O DYE: CPT

## 2018-07-31 RX ORDER — KETOROLAC TROMETHAMINE 30 MG/ML
15 INJECTION, SOLUTION INTRAMUSCULAR; INTRAVENOUS ONCE
Status: COMPLETED | OUTPATIENT
Start: 2018-07-31 | End: 2018-07-31

## 2018-07-31 RX ORDER — DICLOFENAC POTASSIUM 50 MG/1
50 TABLET, FILM COATED ORAL 3 TIMES DAILY
Qty: 21 TABLET | Refills: 0 | Status: SHIPPED | OUTPATIENT
Start: 2018-07-31 | End: 2018-08-03

## 2018-07-31 RX ADMIN — KETOROLAC TROMETHAMINE 15 MG: 30 INJECTION, SOLUTION INTRAMUSCULAR at 12:00

## 2018-07-31 NOTE — ED PROVIDER NOTES
History  Chief Complaint   Patient presents with    Numbness     hx scoliosis, with multiple back sx  pt c/o right side numbness ongoing for months now  pt states 3 weeks ago started with sciatic pain to right side  c/o numbness and tingling  61-year-old male presents for evaluation of chronic right-sided pain, weakness, tingling  Patient states this pain started several months ago has been constant  He rates the pain is moderate to severe in intensity  He states that waxes and wanes and is worse with movement  Associated with weakness and tingling which is unchanged today  Patient presents today secondary to persistent symptoms  Patient has been evaluated for chronic pain syndrome, myofascial pain syndrome by pain management  On 6/27/2018 patient had MRI of the lumbar spine which showed a thoracic fusion extending caudally to T12 level  He had mild convex L2-L3 apex lumbar scoliosis, chronic bilateral L5 spondylolysis, grade 1 spondylolisthesis, no nerve root compression at any level  No evidence of cauda equinus syndrome  On May 31st   Patient had x-ray of entire spine for scoliosis which showed no acute osseous abnormality  Patient has no imaging of his brain  He denies headache, chest pain, shortness of breath, nausea, vomiting, fevers, chills, abdominal pain, diarrhea, constipation  Patient does report history of urinary retention which he has had a normal MRI foreign seen a urologist   He states he is currently peeing normally        History provided by:  Patient and medical records      Prior to Admission Medications   Prescriptions Last Dose Informant Patient Reported? Taking?    Omeprazole (RA OMEPRAZOLE) 20 MG TBEC  Self Yes No   Sig: Take 1 capsule by mouth 2 (two) times a day   baclofen 10 mg tablet   No No   Sig: Take 0 5 tablets (5 mg total) by mouth 3 (three) times a day   naproxen sodium (ALEVE) 220 MG tablet   No No   Sig: Take 1 tablet (220 mg total) by mouth 2 (two) times a day with meals   nortriptyline (PAMELOR) 25 mg capsule   No No   Sig: Take 2 capsules (50 mg total) by mouth daily at bedtime 3 tablets at bedtime      Facility-Administered Medications: None       Past Medical History:   Diagnosis Date    Alcoholism ([address] )     Chronic pain syndrome     Depression     GERD (gastroesophageal reflux disease)     Migraine     Rhabdomyolysis     Scoliosis     Urinary incontinence     Weight loss        Past Surgical History:   Procedure Laterality Date    BACK SURGERY  2011    remove screws    [place] ESOPHAGOGASTRODUODENOSCOPY TRANSORAL DIAGNOSTIC N/A 5/1/2017    Procedure: ESOPHAGOGASTRODUODENOSCOPY (EGD); Surgeon: Roberto Armas MD;  Location: [facility] GI LAB; Service: Gastroenterology    SPINAL FUSION  2001, 2009       Family History   Problem Relation Age of Onset    Hypertension Mother     Breast cancer Mother      I have reviewed and agree with the history as documented  Social History   Substance Use Topics    Smoking status: Former Smoker    Smokeless tobacco: Never Used    Alcohol use No      Comment: Former alcoholic  Review of Systems   Constitutional: Negative for activity change, appetite change, fatigue and fever  HENT: Negative for congestion, dental problem, ear pain, rhinorrhea and sore throat  Eyes: Negative for pain and redness  Respiratory: Negative for chest tightness, shortness of breath and wheezing  Cardiovascular: Negative for chest pain and palpitations  Gastrointestinal: Negative for abdominal pain, blood in stool, constipation, diarrhea, nausea and vomiting  Endocrine: Negative for cold intolerance and heat intolerance  Genitourinary: Negative for dysuria, frequency and hematuria  Skin: Negative for color change, pallor and rash  Neurological: Positive for weakness  Hematological: Does not bruise/bleed easily  Psychiatric/Behavioral: Negative for agitation, hallucinations and suicidal ideas         Physical Exam  Physical Exam   Constitutional: He is oriented to person, place, and time  He appears well-developed and well-nourished  HENT:   Mouth/Throat: No oropharyngeal exudate  TMs normal bilaterally no pharyngeal erythema no rhinorrhea nontender palpation of sinuses, normal looking turbinates   Eyes: Conjunctivae and EOM are normal    Neck: Normal range of motion  Neck supple  No meningeal signs   Cardiovascular: Normal rate, regular rhythm, normal heart sounds and intact distal pulses  Pulmonary/Chest: Effort normal and breath sounds normal  No respiratory distress  He has no wheezes  He has no rales  He exhibits no tenderness  Abdominal: Soft  Bowel sounds are normal  He exhibits no distension and no mass  There is no tenderness  No hernia  No cvat   Musculoskeletal:   Left upper and lower extremity exam is within normal limits  Patient has 4-5 strength in right upper and right lower extremity with symmetric sensation compared to his left  He does stated feels tingly but he feels that the same on right to left  Lymphadenopathy:     He has no cervical adenopathy  Neurological: He is alert and oriented to person, place, and time  No cranial nerve deficit  Skin: No rash noted  No erythema  No edema   Psychiatric: He has a normal mood and affect  His behavior is normal    Nursing note and vitals reviewed        Vital Signs  ED Triage Vitals [07/31/18 1039]   Temperature Pulse Respirations Blood Pressure SpO2   97 8 °F (36 6 °C) (!) 122 18 (!) 154/101 100 %      Temp Source Heart Rate Source Patient Position - Orthostatic VS BP Location FiO2 (%)   Oral Monitor Sitting Right arm --      Pain Score       8           Vitals:    07/31/18 1039 07/31/18 1242   BP: (!) 154/101 133/89   Pulse: (!) 122 97   Patient Position - Orthostatic VS: Sitting Lying       Visual Acuity      ED Medications  Medications   ketorolac (TORADOL) injection 15 mg (15 mg Intramuscular Given 7/31/18 1200)       Diagnostic Studies  Results Reviewed     None                 CT head without contrast   ED Interpretation by Shilpi Campbell MD (07/31 1252)   No CT evidence of acute intracranial process  Final Result by Alfred Stevens MD (07/31 1222)      No CT evidence of acute intracranial process  Workstation performed: [id number]                    Procedures  Procedures       Phone Contacts  ED Phone Contact    ED Course  ED Course as of Jul 31 1401   Tue Jul 31, 2018   1355 CT head is reviewed  Patient with multiple chronic issues without acute component today  CT of head was negative  Patient be instructed to continue his outpatient follow-up with his pain management specialists and referred to Neurology  MDM  Number of Diagnoses or Management Options  Chronic pain, weakness, tingling, R sided:   Diagnosis management comments: Chronic right-sided pain, weakness, tingling without acute component with extensive outpatient workup  Patient has not had any imaging of his brain  Will CT head to rule out acute CNS pathology  If workup is negative will discharged home with continued outpatient workup and neurology follow-up    CritCare Time    Disposition  Final diagnoses:   Chronic pain, weakness, tingling, R sided     Time reflects when diagnosis was documented in both MDM as applicable and the Disposition within this note     Time User Action Codes Description Comment    7/31/2018  1:57 PM Camacho Carmen Add [G89 29] Chronic pain     7/31/2018  1:57 PM Camacho Carmen Modify [G89 29] Chronic pain, weakness, tingling, R sided       ED Disposition     ED Disposition Condition Comment    Discharge  Love Correia discharge to home/self care      Condition at discharge: Good        Follow-up Information     Follow up With Specialties Details Why Contact Maxx Oakes DO Family Medicine Schedule an appointment as soon as possible for a visit in 2 days  [facility] [address] [facility]   [address] [place] [id number]  [phone]          Patient's Medications   Discharge Prescriptions    DICLOFENAC POTASSIUM (CATAFLAM) 50 MG TABLET    Take 1 tablet (50 mg total) by mouth 3 (three) times a day for 7 days       Start Date: 7/31/2018 End Date: 8/7/2018       Order Dose: 50 mg       Quantity: 21 tablet    Refills: 0     No discharge procedures on file      ED Provider  Electronically Signed by           Adarsh Knott MD  07/31/18 7584

## 2018-07-31 NOTE — DISCHARGE INSTRUCTIONS
Chronic Pain   AMBULATORY CARE:   Chronic pain  is pain that does not get better for 3 months or longer  Chronic pain may hurt all the time, or come and go  Call 911 or have someone call 911 for any of the following:   · You are breathing slower than normal, or you have trouble breathing  · You cannot be awakened  · You have a seizure  Seek care immediately if:   · Your heart is beating slower than normal     · Your heart feels like it is jumping or fluttering  · You cannot think clearly  Contact your healthcare provider if:   · You have side effects from prescription pain medicine, such as itching, nausea, or vomiting  · You have trouble sleeping  · Your pain gets worse, even after you take medicine  · You don't think the medicine is working  · You have questions or concerns about your condition or care  Treatment for chronic pain  may need any of the following:  · Acetaminophen  decreases pain  It is available without a doctor's order  Ask how much to take and how often to take it  Follow directions  Read the labels of all other medicines you are using to see if they also contain acetaminophen, or ask your doctor or pharmacist  Acetaminophen can cause liver damage if not taken correctly  Do not use more than 4 grams (4,000 milligrams) total of acetaminophen in one day  · NSAIDs , such as ibuprofen, help decrease swelling, pain, and fever  This medicine is available with or without a doctor's order  NSAIDs can cause stomach bleeding or kidney problems in certain people  If you take blood thinner medicine, always ask your healthcare provider if NSAIDs are safe for you  Always read the medicine label and follow directions  · Prescription pain medicine  called narcotics or opioids may be given  Ask your healthcare provider how to take this medicine safely  · Anesthetics  can be rubbed on your skin or injected into a nerve or muscle to numb an area      · Other medicines  may reduce pain, anxiety, muscle tension, or swelling  Manage your chronic pain:   · Apply heat  on the area in pain for 20 to 30 minutes every 2 hours for as many days as directed  Heat helps decrease pain and muscle spasms  · Apply ice  on the part of your body that hurts for 15 to 20 minutes every hour or as directed  Use an ice pack, or put crushed ice in a plastic bag  Cover it with a towel  Ice decreases pain and swelling, and helps prevent tissue damage  · Go to physical therapy as directed  A physical therapist teaches you exercises to help improve movement and strength, and to decrease pain  · Exercise for 30 minutes, 3 times a week  Regular physical activity can help decrease pain and improve your quality of life  Ask your healthcare provider about the best exercise plan for your type of pain  · Get enough sleep  Create a relaxing bedtime routine  Go to sleep and wake up at the same time every day  Avoid caffeine in the afternoon  · Talk with a counselor or therapist   A type of counseling called cognitive behavioral therapy (CBT) can help your chronic pain by changing the way you think about it  CBT can also improve your mood, sleep, and ability to move  What you must know if you take narcotic pain medicine:   · You may need to take a bowel movement softener  The most common side effect of prescription pain medicine is constipation  Bowel movement softeners are available over the counter  · Do not mix prescription pain medicines  This can cause an overdose of medicine, which can become life-threatening  Read labels  Make sure you know the ingredients in all of your medicines  · Do not drink alcohol  when you take prescription pain medicine  It is not safe to mix narcotics or opioids with alcohol or illegal drugs  · Prescription pain medicine may impair your ability to drive or work safely  They may also cause dizziness and increase your risk for falling       · Store prescription pain medicine in a safe location at home  Keep your medicine away from children and other people  Never share your medicine with anyone  Follow up with your healthcare provider as directed: You may be referred to a pain specialist  Write down your questions so you remember to ask them during your visits  © 2017 [employer] Information is for End User's use only and may not be sold, redistributed or otherwise used for commercial purposes  All illustrations and images included in CareNotes® are the copyrighted property of [employer]  or Marco Antonio Guzman  The above information is an  only  It is not intended as medical advice for individual conditions or treatments  Talk to your doctor, nurse or pharmacist before following any medical regimen to see if it is safe and effective for you

## 2018-07-31 NOTE — ED NOTES
Patient is resting comfortably on stretcher  Pt states he felt numbness and was unable to move his right leg to his normal level this am which is the change that brought him to the ED  Some symptoms and plan of care was documented in last office visit 2 weeks ago  Pt and  are frustrated with lack of "help/concern" and came her today to see if they can get help        Mikala Ross RN  07/31/18 400 Neeta Grant RN  07/31/18 6721

## 2018-08-03 ENCOUNTER — APPOINTMENT (OUTPATIENT)
Dept: RADIOLOGY | Facility: CLINIC | Age: 33
End: 2018-08-03
Payer: COMMERCIAL

## 2018-08-03 ENCOUNTER — OFFICE VISIT (OUTPATIENT)
Dept: OBGYN CLINIC | Facility: MEDICAL CENTER | Age: 33
End: 2018-08-03
Payer: COMMERCIAL

## 2018-08-03 VITALS
DIASTOLIC BLOOD PRESSURE: 89 MMHG | SYSTOLIC BLOOD PRESSURE: 126 MMHG | HEIGHT: 64 IN | BODY MASS INDEX: 23.9 KG/M2 | WEIGHT: 140 LBS | HEART RATE: 121 BPM

## 2018-08-03 DIAGNOSIS — M54.2 NECK PAIN: Primary | ICD-10-CM

## 2018-08-03 DIAGNOSIS — R53.1 WEAKNESS: ICD-10-CM

## 2018-08-03 DIAGNOSIS — M54.2 NECK PAIN: ICD-10-CM

## 2018-08-03 DIAGNOSIS — M54.16 LUMBAR RADICULOPATHY: ICD-10-CM

## 2018-08-03 PROCEDURE — 72040 X-RAY EXAM NECK SPINE 2-3 VW: CPT

## 2018-08-03 PROCEDURE — 99214 OFFICE O/P EST MOD 30 MIN: CPT | Performed by: FAMILY MEDICINE

## 2018-08-03 NOTE — PATIENT INSTRUCTIONS
Explained the patient that he has weakness for 1 month his right lower extremity which may be related to a pinched nerve  Explained that I want him to be evaluated by spine surgeon urgently within the next 1 week     Explained that if he has continued pinching of the nerve in his back he may have permanent damage to the nerve and never regain full strength of his right foot  I explained there are other factors in his history that need to be considered for his diagnoses  He has already had MRI performed June 2018 which showed no evidence of nerve root compression despite weakness at that time  He also has complaints of right upper extremity numbness and tingling with only weakness of his right thumb 4/5  I reviewed red flags with patient including bowel or bladder incontinence, fevers, chills, numbness between the legs explained that if any of these occur he has go the emergency department immediately for emergent evaluation    Patient expressed understanding agreed to plan
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
## 2018-08-07 ENCOUNTER — TELEPHONE (OUTPATIENT)
Dept: FAMILY MEDICINE CLINIC | Facility: CLINIC | Age: 33
End: 2018-08-07

## 2018-08-07 ENCOUNTER — APPOINTMENT (OUTPATIENT)
Dept: RADIOLOGY | Facility: MEDICAL CENTER | Age: 33
End: 2018-08-07
Payer: COMMERCIAL

## 2018-08-07 ENCOUNTER — OFFICE VISIT (OUTPATIENT)
Dept: PAIN MEDICINE | Facility: MEDICAL CENTER | Age: 33
End: 2018-08-07
Payer: COMMERCIAL

## 2018-08-07 VITALS
BODY MASS INDEX: 26.22 KG/M2 | DIASTOLIC BLOOD PRESSURE: 78 MMHG | RESPIRATION RATE: 12 BRPM | HEART RATE: 76 BPM | SYSTOLIC BLOOD PRESSURE: 106 MMHG | HEIGHT: 64 IN | WEIGHT: 153.6 LBS

## 2018-08-07 DIAGNOSIS — R29.898 RIGHT LEG WEAKNESS: Primary | ICD-10-CM

## 2018-08-07 DIAGNOSIS — Z98.1 HISTORY OF THORACIC SPINAL FUSION: ICD-10-CM

## 2018-08-07 DIAGNOSIS — R29.2 HYPERREFLEXIA OF LOWER EXTREMITY: ICD-10-CM

## 2018-08-07 DIAGNOSIS — R29.898 RIGHT LEG WEAKNESS: ICD-10-CM

## 2018-08-07 PROCEDURE — 72120 X-RAY BEND ONLY L-S SPINE: CPT

## 2018-08-07 PROCEDURE — 99214 OFFICE O/P EST MOD 30 MIN: CPT | Performed by: PHYSICAL MEDICINE & REHABILITATION

## 2018-08-07 NOTE — PROGRESS NOTES
Assessment:  1  Right leg weakness    2  Hyperreflexia of lower extremity    3  History of thoracic spinal fusion        Plan:  1  Currently the patient is experiencing significant right-sided lower extremity weakness as well as hyperreflexia of the lower extremities  Negative Babinski bilateral lower extremities  Given the change in symptoms with weakness and lack of findings with his lumbar spine MRI I would like to obtain an MRI of the thoracic spine  2   We will also obtain flexion-extension x-rays of the lumbar spine    3  Agree with neurology consult regarding new onset weakness    4  We will follow up with imaging results when available and consider adjusting his medications depending on neurology workup    My impressions and treatment recommendations were discussed in detail with the patient who verbalized understanding and had no further questions  Discharge instructions were provided  I personally saw and examined the patient and I agree with the above discussed plan of care  Orders Placed This Encounter   Procedures    MRI thoracic spine wo contrast     Standing Status:   Future     Standing Expiration Date:   8/7/2022     Scheduling Instructions: There is no preparation for this test  Please leave your jewelry and valuables at home, wedding rings are the exception  Please bring your physician order, insurance cards, a form of photo ID and a list of your medications with you  Arrive 15 minutes prior to your appointment time in order to       register  Please bring any prior CT or MRI studies of this area that were not performed at a [facility]  To schedule this appointment, please contact Central Scheduling at [phone]  Order Specific Question:   What is the patient's sedation requirement?      Answer:   No Sedation    XR lumbar sp/bending only min 2-3 v     Standing Status:   Future     Standing Expiration Date:   8/7/2022     Scheduling Instructions: Bring along any outside films relating to this procedure  Order Specific Question:   Reason for Exam:     Answer:   back pain     New Medications Ordered This Visit   Medications    diclofenac sodium (VOLTAREN) 50 mg EC tablet     Sig: Take 50 mg by mouth 3 (three) times a day       History of Present Illness:    Mone Dick is a 34 y o  male Seen for evaluation of his right-sided lower extremity paresthesias and weakness  This has been going on for the past few months without any new inciting event or trauma  He is rating his pain now as a 9/10 which is sometimes constant with intermittent exacerbations  He describes sharp pain as well as pressure-like sensation  Currently receiving about 10-15 percent relief of symptoms  We did discuss different medication options and I did review with him and his mother that I did not feel chronic opioid therapy was warranted  They verbalized understanding  I have personally reviewed and/or updated the patient's past medical history, past surgical history, family history, social history, current medications, allergies, and vital signs today  Review of Systems:    Review of Systems   Respiratory: Positive for shortness of breath  Cardiovascular: Negative for chest pain  Gastrointestinal: Positive for vomiting  Negative for constipation, diarrhea and nausea  Musculoskeletal: Positive for gait problem, joint swelling and myalgias  Negative for arthralgias  Skin: Negative for rash  Neurological: Positive for weakness  Negative for dizziness and seizures  All other systems reviewed and are negative        Patient Active Problem List   Diagnosis    Myofascial pain syndrome    Rhabdomyolysis    Migraine headache    GERD without esophagitis    Depression    Acute right-sided thoracic back pain    Chronic right-sided thoracic back pain    Scoliosis (and kyphoscoliosis), idiopathic    Urinary retention    Urinary incontinence Past Medical History:   Diagnosis Date    Alcoholism ([address] )     Chronic pain syndrome     Depression     GERD (gastroesophageal reflux disease)     Migraine     Rhabdomyolysis     Scoliosis     Urinary incontinence     Weight loss        Past Surgical History:   Procedure Laterality Date    BACK SURGERY  2011    remove screws    SC ESOPHAGOGASTRODUODENOSCOPY TRANSORAL DIAGNOSTIC N/A 5/1/2017    Procedure: ESOPHAGOGASTRODUODENOSCOPY (EGD); Surgeon: Layne Tang MD;  Location: [facility]; Service: Gastroenterology    SPINAL FUSION  2001, 2009       Family History   Problem Relation Age of Onset    Hypertension Mother     Breast cancer Mother        Social History     Occupational History    [employer]      Social History Main Topics    Smoking status: Former Smoker    Smokeless tobacco: Never Used    Alcohol use No      Comment: Former alcoholic   Drug use: No    Sexual activity: Not on file       Current Outpatient Prescriptions on File Prior to Visit   Medication Sig    baclofen 10 mg tablet Take 0 5 tablets (5 mg total) by mouth 3 (three) times a day    NAPROXEN PO Take by mouth    nortriptyline (PAMELOR) 25 mg capsule Take 2 capsules (50 mg total) by mouth daily at bedtime 3 tablets at bedtime    Omeprazole (RA OMEPRAZOLE) 20 MG TBEC Take 1 capsule by mouth 2 (two) times a day     No current facility-administered medications on file prior to visit  Allergies   Allergen Reactions    Pregabalin     Augmentin [Amoxicillin-Pot Clavulanate] Other (See Comments) and Rash     AUGMENTIN rash       Physical Exam:    /78   Pulse 76   Resp 12   Ht 5' 4" (1 626 m)   Wt 69 7 kg (153 lb 9 6 oz)   BMI 26 37 kg/m²       LUMBAR  General: Well-developed, well-nourished individual in no acute distress  Mental: Appropriate mood and affect  Grossly oriented with coherent speech and thought processing       Neuro:  Cranial nerves: Cranial nerve function is grossly intact bilaterally    Strength: Bilateral lower extremity strength is normal and symmetric   No atrophy or tone abnormalities noted   Reflexes: Bilateral lower extremity muscle stretch reflexes are hyperreflexic at the knees and ankles    Two beats of ankle clonus on the right, 4 beats on the left    Bilateral upper extremity muscle stretch reflexes are physiologic and symmetric  Negative Story's sign bilaterally  Sensation: No loss of sensation is noted   SLR/Foraminal Compression Maneuvers: Straight leg raising in the sitting position is negative for radicular pain   Gait:  Gait/gross motor: Gait is antalgic on the right, requires single-point cane  Station is normal  Toe walking, heel walking  are normal on the left, patient has difficulty on the right  Musculoskeletal:  Palpation: Inspection and palpation of the spine and extremities are unremarkable   Lymph: No lymphadenopathy is appreciated in the involved extremity   Vessels: No lower extremity edema   Lungs: Breathing is comfortable and regular  No dyspnea noted during examination   Eyes: Visual field grossly intact to confrontation  No redness appreciated  ENT: No craniofacial deformities or asymmetry  No neck masses appreciated  Imaging    INDICATION: R32: Unspecified urinary incontinence  M41 20: Other idiopathic scoliosis, site unspecified  M54 6: Pain in thoracic spine  G89 29: Other chronic pain urinary incontinence, several previous back surgeries, scoliosis     COMPARISON:  X-ray 5/31/2017     TECHNIQUE:  Sagittal T1, sagittal T2, sagittal inversion recovery, axial T1 and axial T2, coronal T2        IMAGE QUALITY:  Thoracic fusion extends caudally, to the T12 level  Cages present within the disc space at T10-T11 and T11-T12 and posterior fusion rods/pedicle screws degrade image quality  Distal cord and Conus are not well seen      FINDINGS:     ALIGNMENT:  Straightening of normal lumbar lordosis, left convex L2-3 apex scoliosis  There appear to be chronic bilateral L5 pars defects resulting in very minor anterolisthesis      MARROW SIGNAL:  Normal marrow signal is identified within the visualized bony structures  No discrete marrow lesion      DISTAL CORD AND CONUS:  Distal cord and conus are distorted by fixation hardware  The conus ends above the mid L1 level, no cord tethering      PARASPINAL SOFT TISSUES:  Paraspinal soft tissues are unremarkable      SACRUM:  Normal signal within the sacrum  No evidence of insufficiency or stress fracture      LOWER THORACIC DISC SPACES:  Obscured by ferromagnetic posterior fixation hardware     LUMBAR DISC SPACES:     L1-L2:  Minor right facet arthrosis     L2-L3:  Mild right greater than left facet arthrosis     L3-L4:  Mild bilateral facet arthrosis     L4-L5:  Asymmetric right facet arthrosis     L5-S1:  Bilateral L5 spondylolysis, grade 1 spondylolisthesis, no root compression      IMPRESSION:     Thoracic fusion extending caudally to the T12 level  Mild left convex L2-3 apex lumbar scoliosis  Chronic bilateral L5 spondylolysis, grade 1 spondylolisthesis, no nerve root compression at any level           Workstation performed: [id number]

## 2018-08-07 NOTE — LETTER
August 7, 2018     Pablo Herrera 24  [address]  [address] [place] [address] [address] [address]    Patient: Audrey Mcclain   YOB: 1985   Date of Visit: 8/7/2018       Dear Dr Mino Lagunas:    Thank you for referring Karyle Mallow to me for evaluation  Below are my notes for this consultation  If you have questions, please do not hesitate to call me  I look forward to following your patient along with you  Sincerely,        Karissa Welsh DO        CC: No Recipients  Karissa Welsh DO  8/7/2018  2:52 PM  Sign at close encounter  Assessment:  1  Right leg weakness    2  Hyperreflexia of lower extremity    3  History of thoracic spinal fusion        Plan:  1  Currently the patient is experiencing significant right-sided lower extremity weakness as well as hyperreflexia of the lower extremities  Negative Babinski bilateral lower extremities  Given the change in symptoms with weakness and lack of findings with his lumbar spine MRI I would like to obtain an MRI of the thoracic spine  2   We will also obtain flexion-extension x-rays of the lumbar spine    3  Agree with neurology consult regarding new onset weakness    4  We will follow up with imaging results when available and consider adjusting his medications depending on neurology workup    My impressions and treatment recommendations were discussed in detail with the patient who verbalized understanding and had no further questions  Discharge instructions were provided  I personally saw and examined the patient and I agree with the above discussed plan of care  Orders Placed This Encounter   Procedures    MRI thoracic spine wo contrast     Standing Status:   Future     Standing Expiration Date:   8/7/2022     Scheduling Instructions: There is no preparation for this test  Please leave your jewelry and valuables at home, wedding rings are the exception   Please bring your physician order, insurance cards, a form of photo ID and a list of your medications with you  Arrive 15 minutes prior to your appointment time in order to       register  Please bring any prior CT or MRI studies of this area that were not performed at a [facility]  To schedule this appointment, please contact Central Scheduling at [phone]  Order Specific Question:   What is the patient's sedation requirement? Answer:   No Sedation    XR lumbar sp/bending only min 2-3 v     Standing Status:   Future     Standing Expiration Date:   8/7/2022     Scheduling Instructions:      Bring along any outside films relating to this procedure  Order Specific Question:   Reason for Exam:     Answer:   back pain     New Medications Ordered This Visit   Medications    diclofenac sodium (VOLTAREN) 50 mg EC tablet     Sig: Take 50 mg by mouth 3 (three) times a day       History of Present Illness:    Love Correia is a 34 y o  male Seen for evaluation of his right-sided lower extremity paresthesias and weakness  This has been going on for the past few months without any new inciting event or trauma  He is rating his pain now as a 9/10 which is sometimes constant with intermittent exacerbations  He describes sharp pain as well as pressure-like sensation  Currently receiving about 10-15 percent relief of symptoms  We did discuss different medication options and I did review with him and his mother that I did not feel chronic opioid therapy was warranted  They verbalized understanding  I have personally reviewed and/or updated the patient's past medical history, past surgical history, family history, social history, current medications, allergies, and vital signs today  Review of Systems:    Review of Systems   Respiratory: Positive for shortness of breath  Cardiovascular: Negative for chest pain  Gastrointestinal: Positive for vomiting  Negative for constipation, diarrhea and nausea     Musculoskeletal: Positive for gait problem, joint swelling and myalgias  Negative for arthralgias  Skin: Negative for rash  Neurological: Positive for weakness  Negative for dizziness and seizures  All other systems reviewed and are negative  Patient Active Problem List   Diagnosis    Myofascial pain syndrome    Rhabdomyolysis    Migraine headache    GERD without esophagitis    Depression    Acute right-sided thoracic back pain    Chronic right-sided thoracic back pain    Scoliosis (and kyphoscoliosis), idiopathic    Urinary retention    Urinary incontinence       Past Medical History:   Diagnosis Date    Alcoholism ([address] )     Chronic pain syndrome     Depression     GERD (gastroesophageal reflux disease)     Migraine     Rhabdomyolysis     Scoliosis     Urinary incontinence     Weight loss        Past Surgical History:   Procedure Laterality Date    BACK SURGERY  2011    remove screws    DE ESOPHAGOGASTRODUODENOSCOPY TRANSORAL DIAGNOSTIC N/A 5/1/2017    Procedure: ESOPHAGOGASTRODUODENOSCOPY (EGD); Surgeon: Patti Phelps MD;  Location: [facility]; Service: Gastroenterology    SPINAL FUSION  2001, 2009       Family History   Problem Relation Age of Onset    Hypertension Mother     Breast cancer Mother        Social History     Occupational History    [employer]      Social History Main Topics    Smoking status: Former Smoker    Smokeless tobacco: Never Used    Alcohol use No      Comment: Former alcoholic      Drug use: No    Sexual activity: Not on file       Current Outpatient Prescriptions on File Prior to Visit   Medication Sig    baclofen 10 mg tablet Take 0 5 tablets (5 mg total) by mouth 3 (three) times a day    NAPROXEN PO Take by mouth    nortriptyline (PAMELOR) 25 mg capsule Take 2 capsules (50 mg total) by mouth daily at bedtime 3 tablets at bedtime    Omeprazole (RA OMEPRAZOLE) 20 MG TBEC Take 1 capsule by mouth 2 (two) times a day     No current facility-administered medications on file prior to visit  Allergies   Allergen Reactions    Pregabalin     Augmentin [Amoxicillin-Pot Clavulanate] Other (See Comments) and Rash     AUGMENTIN rash       Physical Exam:    /78   Pulse 76   Resp 12   Ht 5' 4" (1 626 m)   Wt 69 7 kg (153 lb 9 6 oz)   BMI 26 37 kg/m²        LUMBAR  General: Well-developed, well-nourished individual in no acute distress  Mental: Appropriate mood and affect  Grossly oriented with coherent speech and thought processing   Neuro:  Cranial nerves: Cranial nerve function is grossly intact bilaterally   Strength: Bilateral lower extremity strength is normal and symmetric   No atrophy or tone abnormalities noted   Reflexes: Bilateral lower extremity muscle stretch reflexes are hyperreflexic at the knees and ankles    Two beats of ankle clonus on the right, 4 beats on the left    Bilateral upper extremity muscle stretch reflexes are physiologic and symmetric  Negative Story's sign bilaterally  Sensation: No loss of sensation is noted   SLR/Foraminal Compression Maneuvers: Straight leg raising in the sitting position is negative for radicular pain   Gait:  Gait/gross motor: Gait is antalgic on the right, requires single-point cane  Station is normal  Toe walking, heel walking  are normal on the left, patient has difficulty on the right  Musculoskeletal:  Palpation: Inspection and palpation of the spine and extremities are unremarkable   Lymph: No lymphadenopathy is appreciated in the involved extremity   Vessels: No lower extremity edema   Lungs: Breathing is comfortable and regular  No dyspnea noted during examination   Eyes: Visual field grossly intact to confrontation  No redness appreciated  ENT: No craniofacial deformities or asymmetry  No neck masses appreciated  Imaging    INDICATION: R32: Unspecified urinary incontinence  M41 20: Other idiopathic scoliosis, site unspecified  M54 6: Pain in thoracic spine  G89 29:  Other chronic pain urinary incontinence, several previous back surgeries, scoliosis     COMPARISON:  X-ray 5/31/2017     TECHNIQUE:  Sagittal T1, sagittal T2, sagittal inversion recovery, axial T1 and axial T2, coronal T2        IMAGE QUALITY:  Thoracic fusion extends caudally, to the T12 level  Cages present within the disc space at T10-T11 and T11-T12 and posterior fusion rods/pedicle screws degrade image quality  Distal cord and Conus are not well seen      FINDINGS:     ALIGNMENT:  Straightening of normal lumbar lordosis, left convex L2-3 apex scoliosis  There appear to be chronic bilateral L5 pars defects resulting in very minor anterolisthesis      MARROW SIGNAL:  Normal marrow signal is identified within the visualized bony structures  No discrete marrow lesion      DISTAL CORD AND CONUS:  Distal cord and conus are distorted by fixation hardware  The conus ends above the mid L1 level, no cord tethering      PARASPINAL SOFT TISSUES:  Paraspinal soft tissues are unremarkable      SACRUM:  Normal signal within the sacrum  No evidence of insufficiency or stress fracture      LOWER THORACIC DISC SPACES:  Obscured by ferromagnetic posterior fixation hardware     LUMBAR DISC SPACES:     L1-L2:  Minor right facet arthrosis     L2-L3:  Mild right greater than left facet arthrosis     L3-L4:  Mild bilateral facet arthrosis     L4-L5:  Asymmetric right facet arthrosis     L5-S1:  Bilateral L5 spondylolysis, grade 1 spondylolisthesis, no root compression      IMPRESSION:     Thoracic fusion extending caudally to the T12 level  Mild left convex L2-3 apex lumbar scoliosis  Chronic bilateral L5 spondylolysis, grade 1 spondylolisthesis, no nerve root compression at any level           Workstation performed: [id number]

## 2018-08-07 NOTE — TELEPHONE ENCOUNTER
Neurology called for an order    Bonny Jenkins will be seen on August 16 for possible stroke and right sided numbness

## 2018-08-08 ENCOUNTER — OFFICE VISIT (OUTPATIENT)
Dept: OBGYN CLINIC | Facility: CLINIC | Age: 33
End: 2018-08-08
Payer: COMMERCIAL

## 2018-08-08 VITALS
WEIGHT: 153 LBS | BODY MASS INDEX: 26.12 KG/M2 | HEIGHT: 64 IN | HEART RATE: 109 BPM | SYSTOLIC BLOOD PRESSURE: 130 MMHG | DIASTOLIC BLOOD PRESSURE: 87 MMHG

## 2018-08-08 DIAGNOSIS — M41.20 SCOLIOSIS (AND KYPHOSCOLIOSIS), IDIOPATHIC: Primary | ICD-10-CM

## 2018-08-08 DIAGNOSIS — R29.898 WEAKNESS OF RIGHT LOWER EXTREMITY: ICD-10-CM

## 2018-08-08 PROCEDURE — 99214 OFFICE O/P EST MOD 30 MIN: CPT | Performed by: ORTHOPAEDIC SURGERY

## 2018-08-08 NOTE — PROGRESS NOTES
Assessment/Plan:    Patient seen examined by Dr Jessica Heredia and myself  He has extensive thoracic spine surgical history mentioned below, presenting to us today for 1 to 2 month history of worsening right lower and upper extremity weakness  He does demonstrate myelopathic features on examination today  For this we will order an MRI of the cervical without contrast to be done ASAP for evaluation of spinal cord compression  Follow-up with us in 1-2 weeks for imaging review, which will guide further treatment  All questions answered  Patient and family instructed to call the office with any questions or concerns  Problem List Items Addressed This Visit     Scoliosis (and kyphoscoliosis), idiopathic - Primary    Weakness of right lower extremity            Subjective:      Patient ID: Venus Velez is a 34 y o  male  HPI     The patient is a 34year old male who presents for evaluation with Dr Jessica Heredia, he has a history of extensive thoracic spine surgery including scoliosis correction done at age 17, with later posterior fusion for traumatic injury at age 25, and recent partial hardware removal by Dr Sharron Ray done about two years ago  He states that over the last 1-2 months he has noticed significant right lower extremity weakness to the point where he needs to use a cane with ambulation  He also notes to three month history of right upper extremity clumsiness  He denies any significant pain in his neck or upper extremities  He does have chronic ongoing right-sided thoracic pain which is not new  He denies any fall or traumatic injury did over this timeframe, no bowel or bladder incontinence no saddle anesthesia      The following portions of the patient's history were reviewed and updated as appropriate: allergies, current medications, past family history, past medical history, past social history, past surgical history and problem list     Review of Systems      Objective:      /87 Pulse (!) 109   Ht 5' 4" (1 626 m)   Wt 69 4 kg (153 lb)   BMI 26 26 kg/m²          Physical Exam   Constitutional: He is oriented to person, place, and time  He appears well-developed and well-nourished  No distress  HENT:   Head: Normocephalic and atraumatic  Pulmonary/Chest: Effort normal    Musculoskeletal: He exhibits tenderness  Neurological: He is alert and oriented to person, place, and time  Skin: Skin is warm and dry  He is not diaphoretic  Psychiatric: He has a normal mood and affect  Nursing note and vitals reviewed  No acute distress  TTP over thoracic region  Gait is assisted with a cane with obvious right leg dragging    Positive Halle sign on the left  Babinski sign is absent  No sustained clonus  Hyperreflexia at L4 symmetrically  Right lower extremity demonstrates 4/5 strength L2-S1  4/5 strength C5,6 distribution compared to the contralateral

## 2018-08-09 DIAGNOSIS — R29.898 TRANSIENT WEAKNESS OF RIGHT LOWER EXTREMITY: Primary | ICD-10-CM

## 2018-08-10 ENCOUNTER — TELEPHONE (OUTPATIENT)
Dept: OBGYN CLINIC | Facility: HOSPITAL | Age: 33
End: 2018-08-10

## 2018-08-10 NOTE — TELEPHONE ENCOUNTER
Rosemary Keen  [phone]    Dr Laboy Line    Patients mother called in concerned about dates both MRIs are scheduled, she wanted to know if there was anything that could be done to move them up  Please advise  Thanks

## 2018-08-10 NOTE — TELEPHONE ENCOUNTER
Spoke with Keyonna ROSADO and got his MRI moved up to Tuesday, 8/14 at 1245pm  Called and left voicemail to let patient know  Daryl Rosario if you can call this patient sometime Monday or Tuesday morning to make sure he is aware of this  Thank you

## 2018-08-14 ENCOUNTER — HOSPITAL ENCOUNTER (OUTPATIENT)
Dept: MRI IMAGING | Facility: HOSPITAL | Age: 33
Discharge: HOME | End: 2018-08-14
Payer: COMMERCIAL

## 2018-08-14 DIAGNOSIS — R29.898 WEAKNESS OF RIGHT LOWER EXTREMITY: ICD-10-CM

## 2018-08-14 PROCEDURE — 72141 MRI NECK SPINE W/O DYE: CPT

## 2018-08-16 ENCOUNTER — TELEPHONE (OUTPATIENT)
Dept: PAIN MEDICINE | Facility: MEDICAL CENTER | Age: 33
End: 2018-08-16

## 2018-08-16 ENCOUNTER — OFFICE VISIT (OUTPATIENT)
Dept: NEUROLOGY | Facility: CLINIC | Age: 33
End: 2018-08-16
Payer: COMMERCIAL

## 2018-08-16 VITALS
DIASTOLIC BLOOD PRESSURE: 68 MMHG | WEIGHT: 153 LBS | HEART RATE: 90 BPM | SYSTOLIC BLOOD PRESSURE: 120 MMHG | BODY MASS INDEX: 26.12 KG/M2 | HEIGHT: 64 IN

## 2018-08-16 DIAGNOSIS — R53.1 RIGHT SIDED WEAKNESS: ICD-10-CM

## 2018-08-16 DIAGNOSIS — R29.898 WEAKNESS OF RIGHT LOWER EXTREMITY: Primary | ICD-10-CM

## 2018-08-16 DIAGNOSIS — M54.6 ACUTE RIGHT-SIDED THORACIC BACK PAIN: ICD-10-CM

## 2018-08-16 DIAGNOSIS — R29.898 TRANSIENT WEAKNESS OF RIGHT LOWER EXTREMITY: ICD-10-CM

## 2018-08-16 PROCEDURE — 99215 OFFICE O/P EST HI 40 MIN: CPT | Performed by: PSYCHIATRY & NEUROLOGY

## 2018-08-16 NOTE — PROGRESS NOTES
Patient ID: Dayo Blanton is a 34 y o  male  Assessment/Plan: This is a 51-year-old  male who is here as a new patient referred by Dr Rachana Reeves, DO  He does have a complicated history with chronic back surgeries for history of still scoliosis  Has been suffering from chronic back pain since then  Since June of this year he noticed that he has a right-sided symptoms 1st it was right-sided weakness, then followed by tinc tingling numbness in his right arm, which then progressed to right leg weakness  His examination today is inconsistent  He did have some give-way weakness on exam   I noted him to be limping on the opposite side and bearing weight on the right leg instead of the left on my Gait examination  He does have asymmetric reflexes, slightly brisk on left side compared to the right  Toes were both downgoing bilaterally  At this point etiology is unclear, will need to rule out structural abnormality in his brain  His MRI C-spine which I personally reviewed is negative for any acute findings  His MRI lumbar spine is negative for any cauda equina or cord compression which I personally reviewed  He is awaiting MRI thoracic spine still  PLAN:  -will get MRI brain without contrast   -will get EMG nerve conduction study to rule out peripheral causes of his underlying weakness  -MRI thoracic spine still pending   -continue his current medications for his back pain/spasms  -following pain management and orthopedic surgery    Follow up with Dr Regan Flynn at next available visit  Patient/Guardian was advised to the call the office if they have any questions and concerns in the meantime       Patient/Guardian does understand that if they have any new stroke like symptoms such as facial droop on one side, weakness/paralysis on either side, speech trouble, numbness on one side, balance issues, any vision changes, or any new headache, to call 9-1-1 immediately or to proceed to the nearest ER immediately  Problem List Items Addressed This Visit     Acute right-sided thoracic back pain    Weakness of right lower extremity - Primary    Relevant Orders    MRI brain without contrast    Transient weakness of right lower extremity           Subjective:    HPI    This is a 33 y/o M who is here as a new patient referred by Danielle Dorsey here for numbness and tingling of right side  He does see pain specialist for chronic right-sided thoracic back pain and chronic pain syndrome and myofascial pain syndrome  According to pain management that he saw 1 month ago he has not had any relief from any of the medications  He was switched from metacarbamol to baclofen 10 mg 3 times a day, dose of nortriptyline was increased to 50 mg at bedtime  He has been following up with Orthopedic surgery  He has an appointment with orthopedic surgery coming up soon  He is here today for evaluation of his symptoms  He did have an MRI C-spine which I personally reviewed which was negative for any acute findings  He does have an MRI thoracic spine scheduled for this sunday, which is still pending  He has been having back pain for several years and then he noticed that he has been dropping things with his right hand, sometime before June and then numbness started all over his hand, but mostly in the forearm  He says that about a month ago, that he started having pain in right thigh, and right knee and numbness and tingling down his leg  He has been falling as well  He said he woke up on 7/31 and did not have any feeling on the right arm, so he was seen in the ER on 07/31/2018 for numbness  Patient does have a history of scoliosis with multiple back surgeries  He had a CT Head without contrast which was negative for any stenosis/occlusion  He did have urinary incontinence as well and he was seen by urology which was normal  At that time he was recommended follow up with Neurology outpatient       He has noticed much difference in the change of his medications  He is more concerned about the weakness in his legs, and he has not been able to walk  No stress at home  The following portions of the patient's history were reviewed and updated as appropriate:   He  has a past medical history of Alcoholism ([address] ); Chronic pain syndrome; Depression; GERD (gastroesophageal reflux disease); Migraine; Rhabdomyolysis; Scoliosis; Urinary incontinence; and Weight loss  He   Patient Active Problem List    Diagnosis Date Noted    Transient weakness of right lower extremity 08/16/2018    Weakness of right lower extremity 08/08/2018    Urinary retention 06/13/2018    Urinary incontinence 06/13/2018    Acute right-sided thoracic back pain 05/21/2018    Chronic right-sided thoracic back pain 05/21/2018    Myofascial pain syndrome 03/05/2018    GERD without esophagitis 01/06/2017    Migraine headache 10/02/2012    Rhabdomyolysis 09/19/2012    Depression 09/19/2012    Scoliosis (and kyphoscoliosis), idiopathic 12/16/2008     He  has a past surgical history that includes pr esophagogastroduodenoscopy transoral diagnostic (N/A, 5/1/2017); Spinal fusion (2001, 2009); and Back surgery (2011)  His family history includes Breast cancer in his mother; Hypertension in his mother  He  reports that he has quit smoking  He has never used smokeless tobacco  He reports that he does not drink alcohol or use drugs    Current Outpatient Prescriptions   Medication Sig Dispense Refill    baclofen 10 mg tablet Take 0 5 tablets (5 mg total) by mouth 3 (three) times a day 90 tablet 0    NAPROXEN PO Take 220 mg by mouth 2 (two) times a day        nortriptyline (PAMELOR) 25 mg capsule Take 2 capsules (50 mg total) by mouth daily at bedtime 3 tablets at bedtime 60 capsule 0    Omeprazole (RA OMEPRAZOLE) 20 MG TBEC Take 1 capsule by mouth 2 (two) times a day      diclofenac sodium (VOLTAREN) 50 mg EC tablet Take 50 mg by mouth 3 (three) times a day No current facility-administered medications for this visit  Current Outpatient Prescriptions on File Prior to Visit   Medication Sig    baclofen 10 mg tablet Take 0 5 tablets (5 mg total) by mouth 3 (three) times a day    NAPROXEN PO Take 220 mg by mouth 2 (two) times a day      nortriptyline (PAMELOR) 25 mg capsule Take 2 capsules (50 mg total) by mouth daily at bedtime 3 tablets at bedtime    Omeprazole (RA OMEPRAZOLE) 20 MG TBEC Take 1 capsule by mouth 2 (two) times a day    diclofenac sodium (VOLTAREN) 50 mg EC tablet Take 50 mg by mouth 3 (three) times a day     No current facility-administered medications on file prior to visit  He is allergic to pregabalin and augmentin [amoxicillin-pot clavulanate]       Objective:    Blood pressure 120/68, pulse 90, height 5' 4" (1 626 m), weight 69 4 kg (153 lb)  Physical Exam  General: Patient is not in any acute distress  HEENT: normocephalic, atraumatic  Neck: supple  Heart: regular heart rate and rhythm, no murmurs, rubs and or gallops  Chest: Clear to auscultation bilaterally  Abdomen: soft and non-tender  Skin: no lesions  Musculosketal: no bony abnormalities  Neurologic Examination:   Mental status: alert, awake, and following commands  Speech: Speech is fluent without any dysarthria, no aphasia noted  Cranial Nerves: Pupils equal round reactive to light and extraocular movements intact  Visual fields full to confrontation  Fundus exam - normal, no papilledema noted  Facial sensation intact to soft touch in V1, V2 and V3 distributions  Face symmetric  Tongue midline, able to move side to side  Palate elevation symmetric uvula midline, no deviation noted  Shoulder shrug strong  Motor:   I did notice some give-way weakness on the right side upper extremity and lower extremity minimal effort was given    Sensory:  Does not have decreased sensation but he states that it feels funny when I checked vibration, pinprick and soft touch all on the right side  Cerebellar: Finger-to-nose intact, normal heel to shin  Reflexes:  1+ on the right upper and right lower extremity but 2+ on the left upper left lower extremity, toes downgoing bilaterally  Gait:   Seemed to be dragging hi right leg and bearing weight on his left leg when he is walking used a cane, but towards the end of the meeting when he got up to check out, I noticed that he was limping on the wrong side dragging his left foot and bearing weight on the right side  Neurological Exam      ROS:    Review of Systems   Constitutional: Positive for appetite change and unexpected weight change  Negative for fever  HENT: Negative  Negative for hearing loss, tinnitus, trouble swallowing and voice change  Eyes: Negative  Negative for photophobia and pain  Respiratory: Positive for shortness of breath  Cardiovascular: Negative  Negative for palpitations  Gastrointestinal: Negative  Negative for nausea and vomiting  Endocrine: Negative  Negative for cold intolerance and heat intolerance  Genitourinary: Negative  Negative for dysuria, frequency and urgency  Musculoskeletal: Positive for back pain, gait problem (pain while walking), joint swelling, myalgias and neck pain  Skin: Negative  Negative for rash  Neurological: Positive for numbness  Negative for dizziness, tremors, seizures, syncope, facial asymmetry, speech difficulty, weakness, light-headedness and headaches  Difficulty walking  Tingling  Clumsiness  Balance problems     Hematological: Negative  Does not bruise/bleed easily  Psychiatric/Behavioral: Positive for sleep disturbance (Trouble sleeping,waking up at night )  Negative for confusion and hallucinations

## 2018-08-16 NOTE — TELEPHONE ENCOUNTER
----- Message from Velia Covert, DO sent at 8/16/2018 11:05 AM EDT -----  No instability with flexion extension xrays which is reassuring, awaiting MRI results

## 2018-08-19 ENCOUNTER — HOSPITAL ENCOUNTER (OUTPATIENT)
Dept: MRI IMAGING | Facility: HOSPITAL | Age: 33
Discharge: HOME | End: 2018-08-19
Attending: PHYSICAL MEDICINE & REHABILITATION

## 2018-08-19 DIAGNOSIS — Z98.1 HISTORY OF THORACIC SPINAL FUSION: ICD-10-CM

## 2018-08-19 DIAGNOSIS — R29.2 HYPERREFLEXIA OF LOWER EXTREMITY: ICD-10-CM

## 2018-08-19 DIAGNOSIS — R29.898 RIGHT LEG WEAKNESS: ICD-10-CM

## 2018-08-20 DIAGNOSIS — G89.29 CHRONIC RIGHT-SIDED THORACIC BACK PAIN: ICD-10-CM

## 2018-08-20 DIAGNOSIS — G89.4 CHRONIC PAIN SYNDROME: ICD-10-CM

## 2018-08-20 DIAGNOSIS — M54.6 CHRONIC RIGHT-SIDED THORACIC BACK PAIN: ICD-10-CM

## 2018-08-20 RX ORDER — NAPROXEN 500 MG/1
500 TABLET ORAL 2 TIMES DAILY WITH MEALS
Qty: 60 TABLET | Refills: 1 | Status: SHIPPED | OUTPATIENT
Start: 2018-08-20

## 2018-08-20 RX ORDER — BACLOFEN 10 MG/1
5 TABLET ORAL 3 TIMES DAILY
Qty: 90 TABLET | Refills: 0 | Status: SHIPPED | OUTPATIENT
Start: 2018-08-20 | End: 2018-12-11

## 2018-08-20 RX ORDER — NORTRIPTYLINE HYDROCHLORIDE 25 MG/1
50 CAPSULE ORAL
Qty: 60 CAPSULE | Refills: 1 | Status: SHIPPED | OUTPATIENT
Start: 2018-08-20 | End: 2018-12-11

## 2018-08-20 RX ORDER — NORTRIPTYLINE HYDROCHLORIDE 10 MG/1
CAPSULE ORAL
Qty: 90 CAPSULE | Refills: 0 | OUTPATIENT
Start: 2018-08-20

## 2018-08-20 NOTE — TELEPHONE ENCOUNTER
Lizbeth Feeling, CRNP   [facility] 48 minutes ago (1:10 PM)     Al refilled TY (Routing comment)

## 2018-08-20 NOTE — TELEPHONE ENCOUNTER
Left a detailed message as per release of health information, advising him of the same  C/B # provided for any questions

## 2018-08-20 NOTE — TELEPHONE ENCOUNTER
S/W pt  Advised him of the same  Pt stated he started his thoracic MRI yesterday and got half of it done and then didn't finish it b/c they told him they couldn't see anything b/c of the rods in his back  Pt asking what the next step is? Advised pt will get this message to [address] when he returns to the office on wednesday and then call him back  Pt verbalized understanding

## 2018-08-20 NOTE — TELEPHONE ENCOUNTER
S/W pt  Advised him [facility] does not accept medication refill requests from pharmacies, that the pt needs to call [facility]  Pt requesting refill of nortriptyline as listed, has 3 pills left and has no side effects  Pt requesting refill of naproxen 220 mg, takes 1 tablet 2x/day, has 4 pills left and has no side effects and baclofen 10 mg, takes 1/2 tablet 3x/day, has enough pills for about 1 week and has no side effects  Pt stated he is not taking the diclofenac and that was from the ER and has none left  Pharmacy on file  Please advise

## 2018-08-23 ENCOUNTER — OFFICE VISIT (OUTPATIENT)
Dept: OBGYN CLINIC | Facility: HOSPITAL | Age: 33
End: 2018-08-23
Payer: COMMERCIAL

## 2018-08-23 VITALS
WEIGHT: 153 LBS | HEART RATE: 114 BPM | DIASTOLIC BLOOD PRESSURE: 91 MMHG | BODY MASS INDEX: 26.12 KG/M2 | SYSTOLIC BLOOD PRESSURE: 126 MMHG | HEIGHT: 64 IN

## 2018-08-23 DIAGNOSIS — R53.1 WEAKNESS: ICD-10-CM

## 2018-08-23 DIAGNOSIS — R29.898 WEAKNESS OF RIGHT LOWER EXTREMITY: ICD-10-CM

## 2018-08-23 DIAGNOSIS — M54.2 NECK PAIN: Primary | ICD-10-CM

## 2018-08-23 DIAGNOSIS — M54.16 LUMBAR RADICULOPATHY: ICD-10-CM

## 2018-08-23 PROCEDURE — 99214 OFFICE O/P EST MOD 30 MIN: CPT | Performed by: ORTHOPAEDIC SURGERY

## 2018-08-23 NOTE — PROGRESS NOTES
Assessment/Plan:    Weakness of right lower extremity  Patient presents for follow-up regarding global complains of pain in his thoracolumbar spine as well as weakness in his right upper and lower extremities  He has a history of scoliosis surgery and subsequent revision surgery/removal of hardware  His most recent MRI of the cervical spine was performed to rule out myelopathy/cord compression and this is reviewed here in the office today without evidence of cord compression    MRI of the thoracic spine was not obtained secondary to severe metal artifact      On physical exam today he ambulates slowly  He has ongoing weakness involving the right lower extremity 3+ out of 5 L2-S1 compared to the contralateral which is 4+ out of 5  Assessment and plan  Nilo Lucas has several complaints all neurological in origin including tremors at night right upper and lower extremity weakness and chronic pain syndrome involving the thoracolumbar spine    A CT myelogram of the thoracic spine will be ordered to evaluate the hardware and also to assess for any cord compression  The rest of his workup will be completed by his neurologist             Problem List Items Addressed This Visit     Weakness of right lower extremity     Patient presents for follow-up regarding global complains of pain in his thoracolumbar spine as well as weakness in his right upper and lower extremities  He has a history of scoliosis surgery and subsequent revision surgery/removal of hardware  His most recent MRI of the cervical spine was performed to rule out myelopathy/cord compression and this is reviewed here in the office today without evidence of cord compression    MRI of the thoracic spine was not obtained secondary to severe metal artifact      On physical exam today he ambulates slowly  He has ongoing weakness involving the right lower extremity 3+ out of 5 L2-S1 compared to the contralateral which is 4+ out of 5        Assessment and samir  Chaparrita Cody has several complaints all neurological in origin including tremors at night right upper and lower extremity weakness and chronic pain syndrome involving the thoracolumbar spine    A CT myelogram of the thoracic spine will be ordered to evaluate the hardware and also to assess for any cord compression  The rest of his workup will be completed by his neurologist             Relevant Orders    FL myelogram thoracic    Ambulatory referral to Pain Management    CT spine thoracic w contrast    BUN    Creatinine, serum      Other Visit Diagnoses     Neck pain    -  Primary    Relevant Orders    Ambulatory referral to Pain Management    Weakness        Relevant Orders    Ambulatory referral to Pain Management    CT spine thoracic w contrast    BUN    Creatinine, serum    Lumbar radiculopathy        Relevant Orders    Ambulatory referral to Pain Management    CT spine thoracic w contrast    BUN    Creatinine, serum            Subjective:      Patient ID: Tara Frank is a 34 y o  male  HPI     The patient is a 34year old male who presents today to discuss the findings of her cervical MRI  Patient has a history of extensive thoracic spine surgery including scoliosis correction done at age 17, with later posterior fusion for traumatic injury at age 25, and recent partial hardware removal by Dr Zoe Montanez done about two years ago  He states that over the last 1-2 months he has noticed significant right lower extremity weakness to the point where he needs to use a cane with ambulation  He also notes to three month history of right upper extremity clumsiness  He denies any significant pain in his neck or upper extremities  He does have chronic ongoing right-sided thoracic pain which is not new  He denies any fall or traumatic injury did over this timeframe, no bowel or bladder incontinence no saddle anesthesia  Review of Systems   HENT: Negative for drooling      Eyes: Negative for visual disturbance  Respiratory: Negative for cough  Gastrointestinal: Negative for diarrhea and vomiting  Neurological: Negative for dizziness  Objective:      /91   Pulse (!) 114   Ht 5' 4" (1 626 m)   Wt 69 4 kg (153 lb)   BMI 26 26 kg/m²      Physical Exam   Constitutional: He appears well-developed and well-nourished  HENT:   Head: Normocephalic  Pulmonary/Chest: Effort normal    Skin: Skin is warm and dry  Psychiatric: He has a normal mood and affect         Patient is awake, alert, and oriented x3  Gait: assisted with a cane with obvious right leg dragging  Strength:  quad 3+/5b dorsiflexion 3+/5  plantar 4/5 RLE  as compared to 5/5 L2-S1 LLE  Hyperreflexia of L4 symmetrically  Positive Story's sign B/L  Babinski absent  No clonus    I have personally review imaging in PACs and my interruption as follows:  Normal MRI C-spine

## 2018-08-23 NOTE — ASSESSMENT & PLAN NOTE
Patient presents for follow-up regarding global complains of pain in his thoracolumbar spine as well as weakness in his right upper and lower extremities  He has a history of scoliosis surgery and subsequent revision surgery/removal of hardware  His most recent MRI of the cervical spine was performed to rule out myelopathy/cord compression and this is reviewed here in the office today without evidence of cord compression    MRI of the thoracic spine was not obtained secondary to severe metal artifact      On physical exam today he ambulates slowly  He has ongoing weakness involving the right lower extremity 3+ out of 5 L2-S1 compared to the contralateral which is 4+ out of 5        Assessment and plan  Jihan Rogers has several complaints all neurological in origin including tremors at night right upper and lower extremity weakness and chronic pain syndrome involving the thoracolumbar spine    A CT myelogram of the thoracic spine will be ordered to evaluate the hardware and also to assess for any cord compression  The rest of his workup will be completed by his neurologist

## 2018-08-24 ENCOUNTER — TELEPHONE (OUTPATIENT)
Dept: NEUROLOGY | Facility: CLINIC | Age: 33
End: 2018-08-24

## 2018-08-24 NOTE — TELEPHONE ENCOUNTER
Called [facility] for patient to call us back to schedule his EMG and a follow up appointment for after his EMG

## 2018-08-30 ENCOUNTER — HOSPITAL ENCOUNTER (OUTPATIENT)
Dept: MRI IMAGING | Facility: HOSPITAL | Age: 33
Discharge: HOME | End: 2018-08-30
Attending: PSYCHIATRY & NEUROLOGY
Payer: COMMERCIAL

## 2018-08-30 DIAGNOSIS — R29.898 WEAKNESS OF RIGHT LOWER EXTREMITY: ICD-10-CM

## 2018-08-30 PROCEDURE — 70551 MRI BRAIN STEM W/O DYE: CPT

## 2018-09-07 ENCOUNTER — HOSPITAL ENCOUNTER (OUTPATIENT)
Dept: NEUROLOGY | Facility: HOSPITAL | Age: 33
Discharge: HOME | End: 2018-09-07
Attending: PSYCHIATRY & NEUROLOGY
Payer: COMMERCIAL

## 2018-09-07 DIAGNOSIS — M79.18 MYOFASCIAL PAIN SYNDROME: ICD-10-CM

## 2018-09-07 DIAGNOSIS — R53.1 RIGHT SIDED WEAKNESS: ICD-10-CM

## 2018-09-07 PROCEDURE — 95886 MUSC TEST DONE W/N TEST COMP: CPT | Performed by: PHYSICAL MEDICINE & REHABILITATION

## 2018-09-07 PROCEDURE — 95909 NRV CNDJ TST 5-6 STUDIES: CPT | Performed by: PHYSICAL MEDICINE & REHABILITATION

## 2018-09-07 NOTE — PROCEDURES
[facility]  Electromyography and Nerve Conduction Study      Patient Name:  Joaquin Coppola  MRN: [id number]   :  1985 Date performed: 2018    Age: 34 y o  Consult request by: You Valadez MD      HISTORY:  Joaquin Coppola is a 34 y o  male    Right-hand-dominant male presents with right arm weakness x6 months as well as right leg weakness x2 months, of note the patient has had a cervical and lumbar MRI that were unremarkable  He describes had a "sciatica" type sensation in the right leg which disappeared after myofascial massage by his therapist, now he has a sensation of his right leg falling asleep, the  sensory changes are in a nondermatomal circumferential pattern he also has weakness in the right leg with putting weight on the right leg using his single-point cane for the last 2 months his symptoms in the right upper limb fluctuate depending on if he is having back pain  Here for evaluation for right upper and right lower limb weakness and paresthesias  Past Medical History:   Diagnosis Date    [facility]ism ([facility] [address] )     Chronic pain syndrome     Depression     GERD (gastroesophageal reflux disease)     Migraine     Rhabdomyolysis     Scoliosis     Urinary incontinence     Weight loss          Current Outpatient Prescriptions:     baclofen 10 mg tablet, Take 0 5 tablets (5 mg total) by mouth 3 (three) times a day, Disp: 90 tablet, Rfl: 0    naproxen (NAPROSYN) 500 mg tablet, Take 1 tablet (500 mg total) by mouth 2 (two) times a day with meals, Disp: 60 tablet, Rfl: 1    nortriptyline (PAMELOR) 25 mg capsule, Take 2 capsules (50 mg total) by mouth daily at bedtime 3 tablets at bedtime, Disp: 60 capsule, Rfl: 1    Omeprazole (RA OMEPRAZOLE) 20 MG TBEC, Take 1 capsule by mouth 2 (two) times a day, Disp: , Rfl:     PHYSICAL EXAMINATION:    General: No acute distress, cooperative      HEENT:  Atraumatic normocephalic, extraocular muscles intact, moist mucous membranes,  Lungs:  Symmetric chest expansion, no accessory muscle use  Psych: Nonagitated, pleasant affect    Neuromusculoskeletal:  Patient does have brisk reflexes at the bilateral triceps patella and Achilles, he has positive bilateral to be ankle clonus, pinprick is intact throughout the bilateral upper /lower limbs  He has giveaway weakness with right lower limb motor testing of glute and hip flexion knee extension ankle dorsiflexion EHL as well as right shoulder abduction elbow flexion elbow extension and  otherwise the left upper and left lower limb is 5/5 throughout  Cranial nerves 2-12 grossly intact  EMG/NCS data and waveforms that were performed for this study have been scanned into Epic  Please refer to scanned document for waveforms  IMPRESSION:    1  No electrodiagnostic evidence of right cervical or lumbar radiculopathy, plexopathy,  peripheral neuropathy or other pathology  Needle exam portion with technical limitations due to the patient's poor effort due to give-way weakness in right lower limb and right upper limb , therefore difficult to assess full recruitment      Carly Stanley MD

## 2018-09-10 RX ORDER — METHOCARBAMOL 750 MG/1
TABLET, FILM COATED ORAL
Qty: 90 TABLET | Refills: 0 | OUTPATIENT
Start: 2018-09-10

## 2018-09-15 DIAGNOSIS — M79.18 MYOFASCIAL PAIN SYNDROME: ICD-10-CM

## 2018-09-17 RX ORDER — NORTRIPTYLINE HYDROCHLORIDE 10 MG/1
CAPSULE ORAL
Qty: 90 CAPSULE | Refills: 0 | OUTPATIENT
Start: 2018-09-17

## 2018-09-17 RX ORDER — METHOCARBAMOL 750 MG/1
TABLET, FILM COATED ORAL
Qty: 90 TABLET | Refills: 0 | OUTPATIENT
Start: 2018-09-17

## 2018-09-19 NOTE — TELEPHONE ENCOUNTER
LMOM for pt to C/B, C/B # provided     -Nortriptyline was written on 8/20/18 by AO with 1 refill, so he should have a refill left     --when pt calls back need to ask if need refill of methocarbamol, if so need dose, frequency, pharmacy, how many pills have left, any side effects and if the medication is helping--

## 2018-09-24 ENCOUNTER — HOSPITAL ENCOUNTER (OUTPATIENT)
Dept: RADIOLOGY | Facility: HOSPITAL | Age: 33
Discharge: HOME | End: 2018-09-24
Attending: ORTHOPAEDIC SURGERY
Payer: COMMERCIAL

## 2018-09-24 ENCOUNTER — TRANSCRIBE ORDERS (OUTPATIENT)
Dept: RADIOLOGY | Facility: HOSPITAL | Age: 33
End: 2018-09-24

## 2018-09-24 ENCOUNTER — HOSPITAL ENCOUNTER (OUTPATIENT)
Dept: RADIOLOGY | Facility: HOSPITAL | Age: 33
Discharge: HOME | End: 2018-09-24
Attending: ORTHOPAEDIC SURGERY

## 2018-09-24 VITALS
BODY MASS INDEX: 27.31 KG/M2 | RESPIRATION RATE: 16 BRPM | SYSTOLIC BLOOD PRESSURE: 119 MMHG | HEIGHT: 64 IN | OXYGEN SATURATION: 96 % | WEIGHT: 160 LBS | TEMPERATURE: 99.9 F | DIASTOLIC BLOOD PRESSURE: 82 MMHG | HEART RATE: 82 BPM

## 2018-09-24 DIAGNOSIS — M54.16 LUMBAR RADICULOPATHY: ICD-10-CM

## 2018-09-24 DIAGNOSIS — R29.898 WEAKNESS OF RIGHT LOWER EXTREMITY: ICD-10-CM

## 2018-09-24 DIAGNOSIS — R53.1 WEAKNESS: ICD-10-CM

## 2018-09-24 LAB
APTT PPP: 30 SECONDS (ref 24–36)
INR PPP: 1.06 (ref 0.86–1.17)
PLATELET # BLD AUTO: 372 THOUSANDS/UL (ref 149–390)
PMV BLD AUTO: 9.3 FL (ref 8.9–12.7)
PROTHROMBIN TIME: 13.9 SECONDS (ref 11.8–14.2)

## 2018-09-24 PROCEDURE — 85730 THROMBOPLASTIN TIME PARTIAL: CPT | Performed by: PHYSICIAN ASSISTANT

## 2018-09-24 PROCEDURE — 85049 AUTOMATED PLATELET COUNT: CPT | Performed by: PHYSICIAN ASSISTANT

## 2018-09-24 PROCEDURE — 72128 CT CHEST SPINE W/O DYE: CPT

## 2018-09-24 PROCEDURE — 62303 MYELOGRAPHY LUMBAR INJECTION: CPT

## 2018-09-24 PROCEDURE — 85610 PROTHROMBIN TIME: CPT | Performed by: PHYSICIAN ASSISTANT

## 2018-09-24 RX ORDER — SODIUM CHLORIDE 9 MG/ML
25 INJECTION, SOLUTION INTRAVENOUS CONTINUOUS
Status: DISCONTINUED | OUTPATIENT
Start: 2018-09-24 | End: 2018-09-25 | Stop reason: HOSPADM

## 2018-09-24 RX ORDER — ACETAMINOPHEN 325 MG/1
650 TABLET ORAL EVERY 6 HOURS PRN
Status: DISCONTINUED | OUTPATIENT
Start: 2018-09-24 | End: 2018-09-25 | Stop reason: HOSPADM

## 2018-09-24 RX ORDER — LIDOCAINE HYDROCHLORIDE 10 MG/ML
4 INJECTION, SOLUTION INFILTRATION; PERINEURAL
Status: DISCONTINUED | OUTPATIENT
Start: 2018-09-24 | End: 2018-09-25 | Stop reason: HOSPADM

## 2018-09-24 RX ORDER — SODIUM CHLORIDE, SODIUM LACTATE, POTASSIUM CHLORIDE, CALCIUM CHLORIDE 600; 310; 30; 20 MG/100ML; MG/100ML; MG/100ML; MG/100ML
125 INJECTION, SOLUTION INTRAVENOUS CONTINUOUS
Status: DISCONTINUED | OUTPATIENT
Start: 2018-09-24 | End: 2018-09-24

## 2018-09-24 RX ADMIN — SODIUM CHLORIDE, SODIUM LACTATE, POTASSIUM CHLORIDE, AND CALCIUM CHLORIDE 125 ML/HR: .6; .31; .03; .02 INJECTION, SOLUTION INTRAVENOUS at 13:13

## 2018-09-24 RX ADMIN — IOHEXOL 12 ML: 240 INJECTION, SOLUTION INTRATHECAL; INTRAVASCULAR; INTRAVENOUS; ORAL at 14:31

## 2018-09-24 RX ADMIN — ACETAMINOPHEN 650 MG: 325 TABLET, FILM COATED ORAL at 15:28

## 2018-09-24 NOTE — PROGRESS NOTES
THORACIC MYELOGRAM    INDICATION: Right upper and right lower extremity weakness with intermittent paresthesias for several months  COMPARISON: MRI of the cervical spine without contrast on August 14, 2018  Lumbar spine x-ray on August 7, 2018  MRI of the lumbar spine without contrast on June 27, 2018  FLUOROSCOPY TIME:  1 3 MFT    IMAGES: 13    TECHNIQUE:    After fully explaining the risks, benefits and alternatives of the procedure to the Patient , consent was obtained  The skin overlying the lumbar spine was prepped and draped in the usual sterile fashion  1% lidocaine was infiltrated at the puncture site  Under fluoroscopic guidance a 22 gauge 3 5 inch spinal needle was inserted into the thecal sac at the L2-L3 interlaminar space  12 cc of Omnipaque 240 mg contrast was injected into the thecal sac under fluoroscopy  The needle was removed  Post myelogram plain films and CT were obtained  Please see separate dictation for CT results  No post-procedure complications  The patient was given appropriate instructions  This procedure was performed by Chin Brooks PA-C under the direct supervision of Dr Merlin Cornwall

## 2018-09-24 NOTE — DISCHARGE INSTRUCTIONS
Myelogram   WHAT YOU NEED TO KNOW:   Myelogram, also called myelography, is a procedure that uses an x-ray to examine your spinal canal  Contrast dye is used to help healthcare providers see your nerves, bones, or spinal cord more clearly  DISCHARGE INSTRUCTIONS:   Follow up with your healthcare provider or specialist as directed:  Write down your questions so you remember to ask them during your visits  Drink liquids as directed:  Liquids will help flush the contrast dye out of your body  Ask how much liquid to drink each day, and which liquids to drink  Some foods, such as soup and fruit, also provide liquid  Contact your healthcare provider or specialist if:   · You have bleeding or a discharge coming from where the needle was put into your back  · You have severe neck or back pain  · You have a headache or nausea that does not go away with rest and medicine  · You feel anxious or irritable  · You have questions or concerns about your condition or care  Seek care immediately or call 911 if:   · You have a stiff neck or have trouble thinking clearly  · You have numbness, tingling, or weakness anywhere below your waist     · You have a severe headache that does not get better  · You have a fever  © 2017 [employer]  Information is for End User's use only and may not be sold, redistributed or otherwise used for commercial purposes  All illustrations and images included in CareNotes® are the copyrighted property of [employer]  or Marco Antonio Guzman  The above information is an  only  It is not intended as medical advice for individual conditions or treatments  Talk to your doctor, nurse or pharmacist before following any medical regimen to see if it is safe and effective for you

## 2018-10-15 DIAGNOSIS — M79.18 MYOFASCIAL PAIN SYNDROME: ICD-10-CM

## 2018-10-15 RX ORDER — METHOCARBAMOL 750 MG/1
TABLET, FILM COATED ORAL
Qty: 90 TABLET | Refills: 0 | OUTPATIENT
Start: 2018-10-15

## 2018-10-15 RX ORDER — NORTRIPTYLINE HYDROCHLORIDE 10 MG/1
CAPSULE ORAL
Qty: 90 CAPSULE | Refills: 0 | OUTPATIENT
Start: 2018-10-15

## 2018-10-15 NOTE — TELEPHONE ENCOUNTER
LMOM for pt to C/B, C/B # provided      --when pt calls back need to ask if need refills of both of the medications, if so need dose, frequency, pharmacy, how many pills have left, any side effects and if the medication is helping--

## 2018-11-14 DIAGNOSIS — M79.18 MYOFASCIAL PAIN SYNDROME: ICD-10-CM

## 2018-11-14 RX ORDER — NORTRIPTYLINE HYDROCHLORIDE 10 MG/1
CAPSULE ORAL
Qty: 90 CAPSULE | Refills: 0 | OUTPATIENT
Start: 2018-11-14

## 2018-11-14 RX ORDER — METHOCARBAMOL 750 MG/1
TABLET, FILM COATED ORAL
Qty: 90 TABLET | Refills: 0 | OUTPATIENT
Start: 2018-11-14

## 2018-11-16 NOTE — TELEPHONE ENCOUNTER
S/W pt  Advised pt of the same and of [facility] refill policy  Pt stated he does not need refills for these medications at this time  Pt to C/B 48 hrs prior to running out to request a refill  Pt verbalized understanding

## 2018-12-11 ENCOUNTER — OFFICE VISIT (OUTPATIENT)
Dept: FAMILY MEDICINE CLINIC | Facility: CLINIC | Age: 33
End: 2018-12-11
Payer: COMMERCIAL

## 2018-12-11 VITALS
DIASTOLIC BLOOD PRESSURE: 60 MMHG | TEMPERATURE: 98.4 F | SYSTOLIC BLOOD PRESSURE: 112 MMHG | WEIGHT: 155 LBS | BODY MASS INDEX: 26.61 KG/M2

## 2018-12-11 DIAGNOSIS — H66.92 LEFT OTITIS MEDIA, UNSPECIFIED OTITIS MEDIA TYPE: ICD-10-CM

## 2018-12-11 DIAGNOSIS — J40 BRONCHITIS: Primary | ICD-10-CM

## 2018-12-11 PROBLEM — G89.29 CHRONIC RIGHT-SIDED THORACIC BACK PAIN: Status: RESOLVED | Noted: 2018-05-21 | Resolved: 2018-12-11

## 2018-12-11 PROBLEM — R29.898 TRANSIENT WEAKNESS OF RIGHT LOWER EXTREMITY: Status: RESOLVED | Noted: 2018-08-16 | Resolved: 2018-12-11

## 2018-12-11 PROBLEM — R29.898 WEAKNESS OF RIGHT LOWER EXTREMITY: Status: RESOLVED | Noted: 2018-08-08 | Resolved: 2018-12-11

## 2018-12-11 PROBLEM — M54.6 ACUTE RIGHT-SIDED THORACIC BACK PAIN: Status: RESOLVED | Noted: 2018-05-21 | Resolved: 2018-12-11

## 2018-12-11 PROBLEM — M54.6 CHRONIC RIGHT-SIDED THORACIC BACK PAIN: Status: RESOLVED | Noted: 2018-05-21 | Resolved: 2018-12-11

## 2018-12-11 PROCEDURE — 1036F TOBACCO NON-USER: CPT | Performed by: FAMILY MEDICINE

## 2018-12-11 PROCEDURE — 99213 OFFICE O/P EST LOW 20 MIN: CPT | Performed by: FAMILY MEDICINE

## 2018-12-11 RX ORDER — AZITHROMYCIN 250 MG/1
TABLET, FILM COATED ORAL
Qty: 6 TABLET | Refills: 0 | Status: SHIPPED | OUTPATIENT
Start: 2018-12-11 | End: 2018-12-18

## 2018-12-11 NOTE — PROGRESS NOTES
Assessment/Plan:  Side effect profile medication reviewed  Recommend return to office if no improvement or worsening symptoms  Consider chest x-ray  No problem-specific Assessment & Plan notes found for this encounter  Diagnoses and all orders for this visit:    Bronchitis  -     azithromycin (ZITHROMAX) 250 mg tablet; Take 2 tablets today then 1 tablet daily x 4 days    Left otitis media, unspecified otitis media type          Subjective:      Patient ID: Alana Romano is a 34 y o  male  Patient with cough and cold symptoms over the last 1 week  Cough is occasionally productive  He works at [employer]  He states that some coworkers have been sick with similar symptoms  No high fevers  No GI complaints  The following portions of the patient's history were reviewed and updated as appropriate: allergies, current medications, past family history, past medical history, past social history, past surgical history and problem list     Review of Systems   Constitutional: Negative  HENT: Positive for congestion and sinus pressure  Eyes: Negative  Respiratory: Positive for cough  Cardiovascular: Negative  Gastrointestinal: Negative  Endocrine: Negative  Genitourinary: Negative  Musculoskeletal: Negative  Skin: Negative  Allergic/Immunologic: Negative  Neurological: Negative  Hematological: Negative  Psychiatric/Behavioral: Negative  Objective:      /60   Temp 98 4 °F (36 9 °C)   Wt 70 3 kg (155 lb)   BMI 26 61 kg/m²          Physical Exam   Constitutional: He is oriented to person, place, and time  He appears well-developed and well-nourished  HENT:   Head: Normocephalic and atraumatic  Right Ear: External ear normal  Tympanic membrane is not erythematous and not bulging  Left Ear: Tympanic membrane is not erythematous and not bulging     Nose: Nose normal    Mouth/Throat: Oropharynx is clear and moist and mucous membranes are normal  No oral lesions  No oropharyngeal exudate  Left tympanic membrane erythematous  No fluid posterior  Eyes: Conjunctivae and EOM are normal  Right eye exhibits no discharge  Left eye exhibits no discharge  No scleral icterus  Neck: Normal range of motion  Neck supple  No thyromegaly present  Cardiovascular: Normal rate, regular rhythm and normal heart sounds  Exam reveals no gallop and no friction rub  No murmur heard  Pulmonary/Chest: Effort normal  No respiratory distress  He has no wheezes  He has no rales  He exhibits no tenderness  Abdominal: Soft  Bowel sounds are normal  He exhibits no distension and no mass  There is no tenderness  There is no rebound and no guarding  Musculoskeletal: Normal range of motion  He exhibits no edema, tenderness or deformity  Lymphadenopathy:     He has no cervical adenopathy  Neurological: He is alert and oriented to person, place, and time  He has normal reflexes  No cranial nerve deficit  He exhibits normal muscle tone  Coordination normal    Skin: Skin is warm and dry  No rash noted  No erythema  No pallor  Psychiatric: He has a normal mood and affect  His behavior is normal    Vitals reviewed

## 2018-12-14 DIAGNOSIS — M79.18 MYOFASCIAL PAIN SYNDROME: ICD-10-CM

## 2018-12-17 RX ORDER — METHOCARBAMOL 750 MG/1
TABLET, FILM COATED ORAL
Qty: 90 TABLET | Refills: 0 | OUTPATIENT
Start: 2018-12-17

## 2018-12-17 RX ORDER — NORTRIPTYLINE HYDROCHLORIDE 10 MG/1
CAPSULE ORAL
Qty: 90 CAPSULE | Refills: 0 | OUTPATIENT
Start: 2018-12-17

## 2018-12-19 NOTE — TELEPHONE ENCOUNTER
LMOM for pt to C/B, C/B # provided and office hrs      --when pt calls back need to ask if need refill, if so need dose, frequency, pharmacy, how many pills have left, any side effects and if the medication is helping--

## 2019-01-13 DIAGNOSIS — M79.18 MYOFASCIAL PAIN SYNDROME: ICD-10-CM

## 2019-01-14 DIAGNOSIS — M79.18 MYOFASCIAL PAIN SYNDROME: ICD-10-CM

## 2019-01-14 RX ORDER — NORTRIPTYLINE HYDROCHLORIDE 10 MG/1
CAPSULE ORAL
Qty: 90 CAPSULE | Refills: 0 | OUTPATIENT
Start: 2019-01-14

## 2019-01-14 RX ORDER — METHOCARBAMOL 750 MG/1
TABLET, FILM COATED ORAL
Qty: 90 TABLET | Refills: 0 | OUTPATIENT
Start: 2019-01-14

## 2019-01-14 NOTE — TELEPHONE ENCOUNTER
LMOM for pt to C/B, C/B # provided and office hrs     --when pt calls back need to ask if need refills, if so need dose, frequency, pharmacy, how many pills have left, any side effects and if the medication is helping for both medications--

## 2019-01-16 NOTE — TELEPHONE ENCOUNTER
Attempted to contact pt at home/ cell  Call was picked up, the writer identified herself as Jabierindu, calling from Dr Skinny Marcial office  Call was ended  The writer called back and lmom to cb on home/ cell  Provided cb number and office hours       2nd attempt

## 2019-02-12 DIAGNOSIS — M79.18 MYOFASCIAL PAIN SYNDROME: ICD-10-CM

## 2019-02-13 RX ORDER — NORTRIPTYLINE HYDROCHLORIDE 10 MG/1
CAPSULE ORAL
Qty: 90 CAPSULE | Refills: 0 | OUTPATIENT
Start: 2019-02-13

## 2019-02-13 RX ORDER — METHOCARBAMOL 750 MG/1
TABLET, FILM COATED ORAL
Qty: 90 TABLET | Refills: 0 | OUTPATIENT
Start: 2019-02-13

## 2019-02-13 NOTE — TELEPHONE ENCOUNTER
RN attempted to reach pt on home/cell ph#   VMMLOM wtih c/b number office hours and location provided  -does pt need a refill of each of these meds? Are they helping his pain? Any side effects?

## 2019-02-27 NOTE — TELEPHONE ENCOUNTER
Third attempt, unable to reach pt, pt was sent a CNRYL twice and has not returned our calls, will call pharmacy to remove medications from automated refill request

## 2022-04-28 ENCOUNTER — TELEPHONE (OUTPATIENT)
Dept: FAMILY MEDICINE CLINIC | Facility: CLINIC | Age: 37
End: 2022-04-28

## 2022-04-28 NOTE — TELEPHONE ENCOUNTER
LMOM for pt to return my call  Rcvd outside event notification  Pt has not been seen in office since 12/11/2018  Does pt have a new PCP? If so, who? If not, schedule pt

## 2022-05-03 NOTE — TELEPHONE ENCOUNTER
Attempted to speak with pt regarding updating PCP and spoke with pt's wife, who stated they do have a new PCP and when I asked for the new PCP name, I was hung up on